# Patient Record
Sex: FEMALE | Race: WHITE | Employment: STUDENT | URBAN - METROPOLITAN AREA
[De-identification: names, ages, dates, MRNs, and addresses within clinical notes are randomized per-mention and may not be internally consistent; named-entity substitution may affect disease eponyms.]

---
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
## 2021-05-25 ENCOUNTER — OFFICE VISIT (OUTPATIENT)
Dept: INTERNAL MEDICINE CLINIC | Age: 21
End: 2021-05-25

## 2021-05-25 VITALS
OXYGEN SATURATION: 100 % | RESPIRATION RATE: 17 BRPM | SYSTOLIC BLOOD PRESSURE: 95 MMHG | DIASTOLIC BLOOD PRESSURE: 60 MMHG | WEIGHT: 112 LBS | TEMPERATURE: 96.8 F | HEART RATE: 70 BPM | HEIGHT: 64 IN | BODY MASS INDEX: 19.12 KG/M2

## 2021-05-25 DIAGNOSIS — Z00.00 ENCOUNTER FOR MEDICAL EXAMINATION TO ESTABLISH CARE: Primary | ICD-10-CM

## 2021-05-25 DIAGNOSIS — K13.79 MOUTH SORES: ICD-10-CM

## 2021-05-25 PROBLEM — N94.6 MENSTRUAL CRAMPS: Status: ACTIVE | Noted: 2021-05-25

## 2021-05-25 PROCEDURE — 99213 OFFICE O/P EST LOW 20 MIN: CPT | Performed by: INTERNAL MEDICINE

## 2021-05-25 RX ORDER — VALACYCLOVIR HYDROCHLORIDE 500 MG/1
500 TABLET, FILM COATED ORAL 2 TIMES DAILY
Qty: 20 TABLET | Refills: 0 | Status: SHIPPED | OUTPATIENT
Start: 2021-05-25 | End: 2022-11-03

## 2021-05-25 RX ORDER — NORGESTIMATE AND ETHINYL ESTRADIOL 0.25-0.035
1 KIT ORAL DAILY
COMMUNITY
End: 2022-10-03 | Stop reason: ALTCHOICE

## 2021-05-25 NOTE — PROGRESS NOTES
Ms. Cruz Wang is a new patient who is here to establish care. CC:  New Patient       HPI:  22 yo woman presenting to establish care   At [place] [id number] design     Patient notes since starting sprintec for menstrual cramps getting regular mouth sores that last weeks and are painful. Mother had breast cancer - at age 37 /   Plans to start mammograms early per gynecologist     3 siblings younger   25 , 13 and 9 yo     Takes sprintec   somestimes takes lysine supplement   Last menstrual cycle completed today  Has not done pap smear planning to do it in June   Gynecologist in the area cannot remember name     Review of systems:  Constitutional: negative for fever, chills, weight loss, night sweats   Eyes : negative for vision changes, eye pain and discharge  Nose and Throat: negative for tinnitus, sore throat   Cardiovascular: negative for chest pain, palpitations and shortness of breath  Respiratory: negative for shortness of breath, cough and wheezing   Gastroinstestinal: negative for abdominal pain, nausea, vomiting, diarrhea, constipation, and blood in the stool  Musculoskeletal: negative for back ache and joint ache   Genitourinary: negative for dysuria, nocturia, polyuria and hematuria   Neurologic: Negative for focal weakness, numbness or incoordination  Skin: negative for rash, pruritus  Hematologic: negative for easy bruising      History reviewed. No pertinent past medical history. Past Surgical History:   Procedure Laterality Date    HX HEENT      HX WISDOM TEETH EXTRACTION  2020       Allergies   Allergen Reactions    Cucumber Angioedema       Current Outpatient Medications on File Prior to Visit   Medication Sig Dispense Refill    norgestimate-ethinyl estradioL (Sprintec, 28,) 0.25-35 mg-mcg tab Take 1 Tablet by mouth daily. No current facility-administered medications on file prior to visit.        family history includes Cancer (age of onset: 37) in her mother; No Known Problems in her father. Social History     Socioeconomic History    Marital status: SINGLE     Spouse name: Not on file    Number of children: Not on file    Years of education: Not on file    Highest education level: Not on file   Occupational History    Not on file   Tobacco Use    Smoking status: Never Smoker    Smokeless tobacco: Never Used   Vaping Use    Vaping Use: Never used   Substance and Sexual Activity    Alcohol use: No    Drug use: No    Sexual activity: Not Currently     Birth control/protection: Pill   Other Topics Concern    Not on file   Social History Narrative    Not on file     Social Determinants of Health     Financial Resource Strain:     Difficulty of Paying Living Expenses:    Food Insecurity:     Worried About Running Out of Food in the Last Year:     [address] in the Last Year:    Transportation Needs:     Lack of Transportation (Medical):  Lack of Transportation (Non-Medical):    Physical Activity:     Days of Exercise per Week:     Minutes of Exercise per Session:    Stress:     Feeling of Stress :    Social Connections:     Frequency of Communication with Friends and Family:     Frequency of Social Gatherings with Friends and Family:     Attends Confucianism Services:     Active Member of Clubs or Organizations:     Attends Club or Organization Meetings:     Marital Status:    Intimate Partner Violence:     Fear of Current or Ex-Partner:     Emotionally Abused:     Physically Abused:     Sexually Abused:        Visit Vitals  BP 95/60 (BP 1 Location: Right arm, BP Patient Position: Sitting, BP Cuff Size: Adult)   Pulse 70   Temp 96.8 °F (36 °C) (Temporal)   Resp 17   Ht 5' 4\" (1.626 m)   Wt 112 lb (50.8 kg)   LMP 05/24/2021   SpO2 100%   BMI 19.22 kg/m²     General:  Well appearing female no acute distress  HEENT:   PERRL,normal conjunctiva. External ear and canals normal, TMs normal.  Hearing normal to voice.   Nose without edema or discharge, normal septum. Ulceration with white center on left cheek   Oropharynx: no erythema, no exudates, no lesions, normal tongue. Neck:  Supple. Thyroid normal size, nontender, without nodules. No carotid bruit. No masses or lymphadenopathy  Respiratory: no respiratory distress,  no wheezing, no rhonchi, no rales. No chest wall tenderness. Cardiovascular:  RRR, normal S1S2, no murmur. Gastrointestinal: normal bowel sounds, soft, nontender, without masses. No hepatosplenomegaly. Extremities +2 pulses, no edema, normal sensation   Musculoskeletal:  Normal gait. Normal digits and nails. Normal strength and tone, no atrophy, and no abnormal movement. Skin:  No rash, no lesions, no ulcers. Skin warm, normal turgor, without induration or nodules. Neuro:  A and OX4, fluent speech, cranial nerves normal 2-12. Sensation normal to light touch. DTR symmetrical  Psych:  Normal affect                   Assessment and Plan:     1. Encounter for medical examination to establish care  Normal exam  Healthy weight    2. Mouth sores  Noted since starting sprintec - unlikely related , lasting 10 days, trial of valtrex to take take at onset of sores  - valACYclovir (VALTREX) 500 mg tablet; Take 1 Tablet by mouth two (2) times a day. Take for 3 days at onset on mouth sore  Dispense: 20 Tablet; Refill: 0  Patient to message me if improves     Follow-up and Dispositions    · Return in about 1 year (around 5/25/2022).           Maxwell Escobar MD

## 2021-07-29 ENCOUNTER — TELEPHONE (OUTPATIENT)
Dept: INTERNAL MEDICINE CLINIC | Age: 21
End: 2021-07-29
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
## 2021-08-02 ENCOUNTER — VIRTUAL VISIT (OUTPATIENT)
Dept: INTERNAL MEDICINE CLINIC | Age: 21
End: 2021-08-02

## 2021-08-02 DIAGNOSIS — F07.81 POST CONCUSSION SYNDROME: ICD-10-CM

## 2021-08-02 DIAGNOSIS — G44.319 ACUTE POST-TRAUMATIC HEADACHE, NOT INTRACTABLE: ICD-10-CM

## 2021-08-02 DIAGNOSIS — S06.0X0A CONCUSSION WITHOUT LOSS OF CONSCIOUSNESS, INITIAL ENCOUNTER: Primary | ICD-10-CM

## 2021-08-02 PROCEDURE — 99213 OFFICE O/P EST LOW 20 MIN: CPT | Performed by: INTERNAL MEDICINE

## 2021-08-02 RX ORDER — AMITRIPTYLINE HYDROCHLORIDE 10 MG/1
10 TABLET, FILM COATED ORAL
Qty: 30 TABLET | Refills: 1 | Status: SHIPPED | OUTPATIENT
Start: 2021-08-02 | End: 2022-01-17 | Stop reason: ALTCHOICE
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
## 2021-08-03 NOTE — TELEPHONE ENCOUNTER
#[phone] momJuliana Fairly is asking if the referral for [facility] for concussion therapy has been sent to them yet? Mom would like a call back pertaining to this.

## 2021-08-05 ENCOUNTER — TELEPHONE (OUTPATIENT)
Dept: INTERNAL MEDICINE CLINIC | Age: 21
End: 2021-08-05

## 2021-08-05 NOTE — TELEPHONE ENCOUNTER
----- Message from Zaynab Patton sent at 8/5/2021 11:04 AM EDT -----  Regarding: /Telephone  Patient return call    Caller's first and last name and relationship (if not the patient): Maria R Rebollar      Best contact number(s):[phone]      Whose call is being returned:Dr. Brandon Jules      Details to clarify the request:Mom received VM late       Message from [facility]

## 2021-08-06 ENCOUNTER — APPOINTMENT (OUTPATIENT)
Dept: INTERNAL MEDICINE CLINIC | Age: 21
End: 2021-08-06

## 2021-08-06 LAB
ALBUMIN SERPL-MCNC: 4 G/DL (ref 3.5–5)
ALBUMIN/GLOB SERPL: 1.5 {RATIO} (ref 1.1–2.2)
ALP SERPL-CCNC: 40 U/L (ref 45–117)
ALT SERPL-CCNC: 16 U/L (ref 12–78)
ANION GAP SERPL CALC-SCNC: 7 MMOL/L (ref 5–15)
AST SERPL-CCNC: 9 U/L (ref 15–37)
BASOPHILS # BLD: 0 K/UL (ref 0–0.1)
BASOPHILS NFR BLD: 0 % (ref 0–1)
BILIRUB SERPL-MCNC: 0.4 MG/DL (ref 0.2–1)
BUN SERPL-MCNC: 12 MG/DL (ref 6–20)
BUN/CREAT SERPL: 22 (ref 12–20)
CALCIUM SERPL-MCNC: 9 MG/DL (ref 8.5–10.1)
CHLORIDE SERPL-SCNC: 109 MMOL/L (ref 97–108)
CO2 SERPL-SCNC: 24 MMOL/L (ref 21–32)
CREAT SERPL-MCNC: 0.54 MG/DL (ref 0.55–1.02)
DIFFERENTIAL METHOD BLD: ABNORMAL
EOSINOPHIL # BLD: 0.1 K/UL (ref 0–0.4)
EOSINOPHIL NFR BLD: 1 % (ref 0–7)
ERYTHROCYTE [DISTWIDTH] IN BLOOD BY AUTOMATED COUNT: 12.3 % (ref 11.5–14.5)
GLOBULIN SER CALC-MCNC: 2.6 G/DL (ref 2–4)
GLUCOSE SERPL-MCNC: 96 MG/DL (ref 65–100)
HCT VFR BLD AUTO: 34.5 % (ref 35–47)
HGB BLD-MCNC: 11.2 G/DL (ref 11.5–16)
IMM GRANULOCYTES # BLD AUTO: 0 K/UL (ref 0–0.04)
IMM GRANULOCYTES NFR BLD AUTO: 0 % (ref 0–0.5)
LYMPHOCYTES # BLD: 1.7 K/UL (ref 0.8–3.5)
LYMPHOCYTES NFR BLD: 24 % (ref 12–49)
MCH RBC QN AUTO: 30.3 PG (ref 26–34)
MCHC RBC AUTO-ENTMCNC: 32.5 G/DL (ref 30–36.5)
MCV RBC AUTO: 93.2 FL (ref 80–99)
MONOCYTES # BLD: 0.4 K/UL (ref 0–1)
MONOCYTES NFR BLD: 6 % (ref 5–13)
NEUTS SEG # BLD: 4.8 K/UL (ref 1.8–8)
NEUTS SEG NFR BLD: 69 % (ref 32–75)
NRBC # BLD: 0 K/UL (ref 0–0.01)
NRBC BLD-RTO: 0 PER 100 WBC
PLATELET # BLD AUTO: 229 K/UL (ref 150–400)
PMV BLD AUTO: 9.9 FL (ref 8.9–12.9)
POTASSIUM SERPL-SCNC: 3.7 MMOL/L (ref 3.5–5.1)
PROT SERPL-MCNC: 6.6 G/DL (ref 6.4–8.2)
RBC # BLD AUTO: 3.7 M/UL (ref 3.8–5.2)
SODIUM SERPL-SCNC: 140 MMOL/L (ref 136–145)
T4 FREE SERPL-MCNC: 1.2 NG/DL (ref 0.8–1.5)
TSH SERPL DL<=0.05 MIU/L-ACNC: 0.76 UIU/ML (ref 0.36–3.74)
WBC # BLD AUTO: 7 K/UL (ref 3.6–11)

## 2021-08-09 NOTE — PROGRESS NOTES
Mild anemia close to normal.  This is common due to menstrual periods.   Increase iron intake in foods : meats, green vegetables, spinach, lima beans, red beans, dried apricots

## 2022-01-17 ENCOUNTER — VIRTUAL VISIT (OUTPATIENT)
Dept: INTERNAL MEDICINE CLINIC | Age: 22
End: 2022-01-17

## 2022-01-17 DIAGNOSIS — J40 BRONCHITIS: Primary | ICD-10-CM

## 2022-01-17 PROCEDURE — 99213 OFFICE O/P EST LOW 20 MIN: CPT | Performed by: INTERNAL MEDICINE

## 2022-01-17 RX ORDER — PREDNISONE 20 MG/1
40 TABLET ORAL
Qty: 8 TABLET | Refills: 0 | Status: SHIPPED
Start: 2022-01-17 | End: 2022-03-24

## 2022-01-17 RX ORDER — AZITHROMYCIN 250 MG/1
250 TABLET, FILM COATED ORAL SEE ADMIN INSTRUCTIONS
Qty: 6 TABLET | Refills: 0 | Status: SHIPPED | OUTPATIENT
Start: 2022-01-17 | End: 2022-01-17 | Stop reason: ALTCHOICE

## 2022-01-17 RX ORDER — AMOXICILLIN AND CLAVULANATE POTASSIUM 875; 125 MG/1; MG/1
1 TABLET, FILM COATED ORAL 2 TIMES DAILY
Qty: 14 TABLET | Refills: 0 | Status: SHIPPED
Start: 2022-01-17 | End: 2022-03-24

## 2022-01-17 NOTE — PROGRESS NOTES
Nestor Sims (: 2000) is a 24 y.o. female, established patient, here for evaluation of the following chief complaint(s):   Follow-up (covid)       ASSESSMENT/PLAN:  Below is the assessment and plan developed based on review of pertinent history, labs, studies, and medications. No follow-ups on file. Nestor Sims, was evaluated through a synchronous (real-time) audio-video encounter. The patient (or guardian if applicable) is aware that this is a billable service. Verbal consent to proceed has been obtained within the past 12 months. The visit was conducted pursuant to the emergency declaration under the [facility], [address] [place] [address] authority and the [employer] and [employer] Act. Patient identification was verified, and a caregiver was present when appropriate. The patient was located in a state where the provider was credentialed to provide care. An electronic signature was used to authenticate this note.   -- Krystal Weaver LPN

## 2022-01-17 NOTE — LETTER
NOTIFICATION RETURN TO WORK / SCHOOL    1/17/2022 3:08 PM    Ms. Brynn Montalvo  [address] [address]      To Whom It May Concern:    Brynn Montalvo is currently under the care of [facility]. She will return to work/school on: 01/19/2022    If there are questions or concerns please have the patient contact our office.         Sincerely,      Dom Thompson MD

## 2022-01-17 NOTE — PROGRESS NOTES
CC: Follow-up (covid)      HPI:    She is a 24 y.o. female who presents for evaluation of       Concussion resolved        Got  January 1st   Then tested positive January 3rd   less symptomatic    Patient felt up and down but now doing worst having chest congestion  and sinus pressure  No fever  Feels fatigued, difficulty getting out of bed   She was vaccinated    Has to return to school tomorrow at [address]    Also notes painful urination and wonders if has UTI \" seems to be  Clearing up\"       This is an established visit conducted via telemedicine with video. The patient has been instructed that this meets HIPAA criteria and acknowledges and agrees to this method of visitation. Pursuant to the emergency declaration under the [facility], [facility] waiver authority and the [employer] and [employer] Act, this Virtual Visit was conducted, with patient's consent, to reduce the patient's risk of exposure to COVID-19 and provide continuity of care for an established patient. Services were provided through a video synchronous discussion virtually to substitute for in-person clinic visit. ROS:      History reviewed. No pertinent past medical history. Current Outpatient Medications on File Prior to Visit   Medication Sig Dispense Refill    norgestimate-ethinyl estradioL (Sprintec, 28,) 0.25-35 mg-mcg tab Take 1 Tablet by mouth daily.  valACYclovir (VALTREX) 500 mg tablet Take 1 Tablet by mouth two (2) times a day. Take for 3 days at onset on mouth sore 20 Tablet 0     No current facility-administered medications on file prior to visit.        Past Surgical History:   Procedure Laterality Date    HX HEENT      HX WISDOM TEETH EXTRACTION  2020       Family History   Problem Relation Age of Onset    Cancer Mother 37        breast cancer     No Known Problems Father      Reviewed and no changes Social History     Socioeconomic History    Marital status:      Spouse name: Not on file    Number of children: Not on file    Years of education: Not on file    Highest education level: Not on file   Occupational History    Not on file   Tobacco Use    Smoking status: Never Smoker    Smokeless tobacco: Never Used   Vaping Use    Vaping Use: Never used   Substance and Sexual Activity    Alcohol use: No    Drug use: No    Sexual activity: Not Currently     Birth control/protection: Pill   Other Topics Concern    Not on file   Social History Narrative    Not on file     Social Determinants of Health     Financial Resource Strain:     Difficulty of Paying Living Expenses: Not on file   Food Insecurity:     Worried About Running Out of Food in the Last Year: Not on file    Kimberlee of Food in the Last Year: Not on file   Transportation Needs:     Lack of Transportation (Medical): Not on file    Lack of Transportation (Non-Medical): Not on file   Physical Activity:     Days of Exercise per Week: Not on file    Minutes of Exercise per Session: Not on file   Stress:     Feeling of Stress : Not on file   Social Connections:     Frequency of Communication with Friends and Family: Not on file    Frequency of Social Gatherings with Friends and Family: Not on file    Attends Confucianism Services: Not on file    Active Member of [address] [place] [address] [employer] or Organizations: Not on file    Attends Club or Organization Meetings: Not on file    Marital Status: Not on file   Intimate Partner Violence:     Fear of Current or Ex-Partner: Not on file    Emotionally Abused: Not on file    Physically Abused: Not on file    Sexually Abused: Not on file   Housing Stability:     Unable to Pay for Housing in the Last Year: Not on file    Number of [place] in the Last Year: Not on file    Unstable Housing in the Last Year: Not on file          There were no vitals taken for this visit.     Physical Examination:   Gen: well appearing female  HEENT: normal conjunctiva, no audible congestion, patient does not see oral erythema, has MMM  Neck: patient does not feel enlarged or tender LAD or masses  Resp: normal respiratory effort, no audible wheezing. CV: patient does not feel palpitations or heart irregularity  Abd: patient does not feel abdominal tenderness or mass, patient does not notice distension  Extrem: patient does not see swelling in ankles or joints. Neuro: Alert and oriented, able to answer questions without difficulty, able to move all extremities and walk normally          Lab Results   Component Value Date/Time    WBC 7.0 08/06/2021 11:00 AM    HGB 11.2 (L) 08/06/2021 11:00 AM    HCT 34.5 (L) 08/06/2021 11:00 AM    PLATELET 142 38/09/7553 11:00 AM    MCV 93.2 08/06/2021 11:00 AM     Lab Results   Component Value Date/Time    Sodium 140 08/06/2021 11:00 AM    Potassium 3.7 08/06/2021 11:00 AM    Chloride 109 (H) 08/06/2021 11:00 AM    CO2 24 08/06/2021 11:00 AM    Anion gap 7 08/06/2021 11:00 AM    Glucose 96 08/06/2021 11:00 AM    BUN 12 08/06/2021 11:00 AM    Creatinine 0.54 (L) 08/06/2021 11:00 AM    BUN/Creatinine ratio 22 (H) 08/06/2021 11:00 AM    GFR est AA >60 08/06/2021 11:00 AM    GFR est non-AA >60 08/06/2021 11:00 AM    Calcium 9.0 08/06/2021 11:00 AM     No results found for: CHOL, CHOLPOCT, CHOLX, CHLST, CHOLV, HDL, HDLPOC, HDLP, LDL, LDLCPOC, LDLC, DLDLP, VLDLC, VLDL, TGLX, TRIGL, TRIGP, TGLPOCT, CHHD, CHHDX  Lab Results   Component Value Date/Time    TSH 0.76 08/06/2021 11:00 AM     No results found for: PSA, PSA2, PSAR1, PSA1, PSAR2, PSA3, PSAR3, [id number], [id number]  No results found for: HBA1C, YBZ0IYRK, TPY2EFCT, IST7TQSR  No results found for: VITD3, XQVID2, XQVID3, [place], VD3RIA    Lab Results   Component Value Date/Time    ALT (SGPT) 16 08/06/2021 11:00 AM    Alk. phosphatase 40 (L) 08/06/2021 11:00 AM    Bilirubin, total 0.4 08/06/2021 11:00 AM           Assessment/Plan:    1.  Bronchitis  Had covid 19 diagnosed January 3rd and now with bronchitis symptoms   - predniSONE (DELTASONE) 20 mg tablet; Take 40 mg by mouth daily (with breakfast). Dispense: 8 Tablet; Refill: 0  - amoxicillin-clavulanate (AUGMENTIN) 875-125 mg per tablet; Take 1 Tablet by mouth two (2) times a day. Dispense: 14 Tablet; Refill: 0  Note for school done Yasmin Hankins MD    This is an established visit conducted via real time video and audio telemedicine. The patient has been instructed that this meets HIPAA criteria and acknowledges and agrees to this method of visitation.

## 2022-01-27 ENCOUNTER — PATIENT MESSAGE (OUTPATIENT)
Dept: INTERNAL MEDICINE CLINIC | Age: 22
End: 2022-01-27

## 2022-01-27 RX ORDER — FLUCONAZOLE 150 MG/1
150 TABLET ORAL DAILY
Qty: 2 TABLET | Refills: 0 | Status: SHIPPED | OUTPATIENT
Start: 2022-01-27 | End: 2022-01-28

## 2022-01-27 NOTE — TELEPHONE ENCOUNTER
Eliezer Szymanski 1/27/2022 8:34 AM EST      ----- Message -----  From: Bharati Borges  Sent: 1/27/2022 7:08 AM EST  To: [facility] Nurse Pool  Subject: Yeast infection     After being on the antibiotics and steroids, I am all better! But antibiotics often cause me to get a yeast infection, I think I have one now. Is it possible for you to call in Diflucan for me?  Thank you

## 2022-03-19 PROBLEM — N94.6 MENSTRUAL CRAMPS: Status: ACTIVE | Noted: 2021-05-25

## 2022-03-24 ENCOUNTER — VIRTUAL VISIT (OUTPATIENT)
Dept: INTERNAL MEDICINE CLINIC | Age: 22
End: 2022-03-24

## 2022-03-24 DIAGNOSIS — J06.9 ACUTE URI: Primary | ICD-10-CM

## 2022-03-24 PROCEDURE — 99213 OFFICE O/P EST LOW 20 MIN: CPT | Performed by: INTERNAL MEDICINE

## 2022-03-24 RX ORDER — ESCITALOPRAM OXALATE 10 MG/1
TABLET ORAL
COMMUNITY
Start: 2022-02-15

## 2022-03-24 RX ORDER — PREDNISONE 10 MG/1
TABLET ORAL
Qty: 21 TABLET | Refills: 0 | Status: SHIPPED | OUTPATIENT
Start: 2022-03-24 | End: 2022-03-29 | Stop reason: ALTCHOICE

## 2022-03-24 RX ORDER — FLUCONAZOLE 150 MG/1
TABLET ORAL
COMMUNITY
Start: 2022-02-15 | End: 2022-10-03 | Stop reason: ALTCHOICE

## 2022-03-24 NOTE — PROGRESS NOTES
Sridhar Cantor is a 24 y.o. female  Chief Complaint   Patient presents with    Cold Symptoms    Sinus Infection     about 2 weeks      Health Maintenance Due   Topic Date Due    DTaP/Tdap/Td series (2 - Tdap) 05/31/2012    Pap Smear  Never done    Flu Vaccine (1) Never done    COVID-19 Vaccine (3 - Booster for Pfizer series) 09/01/2021     There were no vitals taken for this visit. 1. Have you been to the ER, urgent care clinic since your last visit? Hospitalized since your last visit? No     2. Have you seen or consulted any other health care providers outside of the [address] [place] [address] since your last visit? Include any pap smears or colon screening. Yes, [facility] for [facility]rep test and was neg. Patient-Reported Vitals 3/24/2022   Patient-Reported Weight 112lb   Patient-Reported Height -   Patient-Reported Temperature -   Patient-Reported LMP -       Patient Phone Number/Email:  [email or web address]

## 2022-03-24 NOTE — PROGRESS NOTES
Karen Conner is a 24 y.o. female who was seen by synchronous (real-time) audio-video technology on 3/24/2022 for Cold Symptoms and Sinus Infection (about 2 weeks )        Progress Note         PROGRESS NOTE  Name: Karen Conner   : 2000       ASSESSMENT/ PLAN:     Acute URI: Unclear etiology, could have an allergic component. Treat symptomatically. Since she has had this for 2 weeks, go ahead with antibiotics (today is day 2). We'll add a steroid taper. Follow-up and Dispositions  ·   Return if symptoms worsen or fail to improve. I have reviewed the patient's medications and risks/side effects/benefits were discussed. Diagnosis(-es) explained to patient and questions answered. Literature provided where appropriate. SUBJECTIVE  Ms. Karen Conner is a patient of Francois Gonzalez MD and presents today acutely for     Chief Complaint   Patient presents with    Cold Symptoms    Sinus Infection     about 2 weeks        She has been sick for two weeks. \"Headache, sore throat, and fatigue. \" No fevers. It improved over the weekend, and it is coming back. She and her  were tested for strep, and negative. She was negative for COVID. She was evaluated by a [facility] doctor, and was put on penicillin. \"I'm worse now. I have a dry cough, difficulty breathing, feels like bronchitis I've had before. \"     Her illness occurred after her trip to [place], and \"I used to feel like this when I lived in [place]. I was sick a lot there and had trouble breathing and got bad chest infections. \"     She takes zyrtec daily. OBJECTIVE  There were no vitals taken for this visit. Gen: Pleasant 24 y.o.  female in NAD.              Objective:     Patient-Reported Vitals 3/24/2022   Patient-Reported Weight 112lb   Patient-Reported Height -   Patient-Reported Temperature -   Patient-Reported LMP -        [INSTRUCTIONS:  \"[x]\" Indicates a positive item  \"[]\" Indicates a negative item -- DELETE ALL ITEMS NOT EXAMINED]    Constitutional: [x] Appears well-developed and well-nourished [x] No apparent distress      [] Abnormal -     Mental status: [x] Alert and awake  [x] Oriented to person/place/time [x] Able to follow commands    [] Abnormal -     Eyes:   EOM    [x]  Normal    [] Abnormal -   Sclera  [x]  Normal    [] Abnormal -          Discharge [x]  None visible   [] Abnormal -     HENT: [x] Normocephalic, atraumatic  [] Abnormal -   [x] Mouth/Throat: Mucous membranes are moist    External Ears [x] Normal  [] Abnormal -    Neck: [x] No visualized mass [] Abnormal -     Pulmonary/Chest: [x] Respiratory effort normal   [x] No visualized signs of difficulty breathing or respiratory distress        [] Abnormal -      Musculoskeletal:   [x] Normal gait with no signs of ataxia         [x] Normal range of motion of neck        [] Abnormal -     Neurological:        [x] No Facial Asymmetry (Cranial nerve 7 motor function) (limited exam due to video visit)          [x] No gaze palsy        [] Abnormal -          Skin:        [x] No significant exanthematous lesions or discoloration noted on facial skin         [] Abnormal -            Psychiatric:       [x] Normal Affect [] Abnormal -       Other pertinent observable physical exam findings:-        We discussed the expected course, resolution and complications of the diagnosis(es) in detail. Medication risks, benefits, costs, interactions, and alternatives were discussed as indicated. I advised her to contact the office if her condition worsens, changes or fails to improve as anticipated. She expressed understanding with the diagnosis(es) and plan. Nestor Sims, who was evaluated through a patient-initiated, synchronous (real-time) audio-video encounter, and/or her healthcare decision maker, is aware that it is a billable service, with coverage as determined by her insurance carrier.  She provided verbal consent to proceed: YES, and patient identification was verified. It was conducted pursuant to the emergency declaration under the [facility], [address] [place] [address] and the [employer] [facility] General Act. A caregiver was present when appropriate. Ability to conduct physical exam was limited. I was in office. The patient was at home or otherwise outside the office.       Lisa Mccarty MD

## 2022-03-28 ENCOUNTER — PATIENT MESSAGE (OUTPATIENT)
Dept: INTERNAL MEDICINE CLINIC | Age: 22
End: 2022-03-28

## 2022-03-28 NOTE — TELEPHONE ENCOUNTER
----- Message from Jose Young sent at 3/28/2022  8:13 AM EDT -----  Subject: Appointment Request    Reason for Call: Urgent Cough Cold    QUESTIONS  Type of Appointment? Established Patient  Reason for appointment request? No appointments available during search  Additional Information for Provider? pt called to make an appt for   possible bronchitis I offered to do a virtual visit with another provider   and she refused  ---------------------------------------------------------------------------  --------------  CALL BACK INFO  What is the best way for the office to contact you? OK to leave message on   voicemail  Preferred Call Back Phone Number? [phone]  ---------------------------------------------------------------------------  --------------  SCRIPT ANSWERS  Relationship to Patient? Self  Are you currently unable to finish sentences due to any difficulty   breathing? No  Are you unable to swallow liquids? No  Are you having fevers (100.4 or greater), chills, or sweats? No  Do you have COPD, asthma or a chronic lung condition? No  Have your symptoms been present for more than 5 days? Yes   Have you been diagnosed with, awaiting test results for, or told that you   are suspected of having COVID-19 (Coronavirus)? (If patient has tested   negative or was tested as a requirement for work, school, or travel and   not based on symptoms, answer no)? No  Within the past 10 days have you developed any of the following symptoms   (answer no if symptoms have been present longer than 10 days or began   more than 10 days ago)? Fever or Chills, Cough, Shortness of breath or   difficulty breathing, Loss of taste or smell, Sore throat, Nasal   congestion, Sneezing or runny nose, Fatigue or generalized body aches   (answer no if pain is specific to a body part e.g. back pain), Diarrhea,   Headache?  Yes

## 2022-03-28 NOTE — TELEPHONE ENCOUNTER
3/28 booked a virtual appointment for tomorrow. Tried to call but  isn't set up.   Sent my chart message

## 2022-03-29 ENCOUNTER — HOSPITAL ENCOUNTER (OUTPATIENT)
Dept: GENERAL RADIOLOGY | Age: 22
Discharge: HOME | End: 2022-03-29

## 2022-03-29 ENCOUNTER — VIRTUAL VISIT (OUTPATIENT)
Dept: INTERNAL MEDICINE CLINIC | Age: 22
End: 2022-03-29

## 2022-03-29 DIAGNOSIS — J40 BRONCHITIS: Primary | ICD-10-CM

## 2022-03-29 DIAGNOSIS — J45.31 MILD PERSISTENT REACTIVE AIRWAY DISEASE WITH ACUTE EXACERBATION: ICD-10-CM

## 2022-03-29 DIAGNOSIS — J40 BRONCHITIS: ICD-10-CM

## 2022-03-29 PROCEDURE — 71046 X-RAY EXAM CHEST 2 VIEWS: CPT

## 2022-03-29 PROCEDURE — 99213 OFFICE O/P EST LOW 20 MIN: CPT | Performed by: INTERNAL MEDICINE

## 2022-03-29 RX ORDER — ALBUTEROL SULFATE 90 UG/1
1 AEROSOL, METERED RESPIRATORY (INHALATION)
Qty: 18 G | Refills: 1 | Status: SHIPPED | OUTPATIENT
Start: 2022-03-29

## 2022-03-29 RX ORDER — CEFUROXIME AXETIL 500 MG/1
500 TABLET ORAL 2 TIMES DAILY
Qty: 14 TABLET | Refills: 0 | Status: SHIPPED | OUTPATIENT
Start: 2022-03-29 | End: 2022-03-31 | Stop reason: ALTCHOICE

## 2022-03-29 NOTE — PROGRESS NOTES
Good news is chest x ray shows no lung infection , no pneumonia. Symptoms are due to bronchitis plus likely a sinus infection.  Take the antibiotic prescribed and let me know how you are doing tomorrow

## 2022-03-29 NOTE — PROGRESS NOTES
1. \"Have you been to the ER, urgent care clinic since your last visit? Hospitalized since your last visit? \" Yes Reason for visit: [address]    2. \"Have you seen or consulted any other health care providers outside of the [address] [place] [address] since your last visit? \" No     3. For patients aged 39-70: Has the patient had a colonoscopy / FIT/ Cologuard? NA - based on age      If the patient is female:    4. For patients aged 41-77: Has the patient had a mammogram within the past 2 years? NA - based on age or sex      11. For patients aged 21-65: Has the patient had a pap smear? Yes - Care Gap present.  Rooming MA/LPN to request most recent results  [facility]

## 2022-03-29 NOTE — PROGRESS NOTES
CC: Nasal Congestion, GERD, Back Pain, and Breathing Problem      HPI:    She is a 24 y.o. female who presents for evaluation of of URI started out 3 weeks ago  Started with sore throat and HA /Tested negative for strep at [facility] and prescribed  Penicillin  Started with sore throat and HA   Then had congestion and then symptoms moved to chest, last night had severe back pain and shortness of breath. Breathing is better today   Saw  Dr Kaelyn Mark 03/24 and prescribed steroids - no improvement in symptoms  She had Covid 23 in January and has had recurrent URIs since then   She had frequent bronchitis when she lived in [place]    She has had recurrent yeast infections since getting  and taking weekly fluconazole    She is on lexapro for depression which is stable    She is at [address] [place] [address]      This is an established visit conducted via telemedicine with video. The patient has been instructed that this meets HIPAA criteria and acknowledges and agrees to this method of visitation. Pursuant to the emergency declaration under the [facility] waiver authority and the [employer] and [employer] Act, this Virtual Visit was conducted, with patient's consent, to reduce the patient's risk of exposure to COVID-19 and provide continuity of care for an established patient. Services were provided through a video synchronous discussion virtually to substitute for in-person clinic visit. ROS:  10 systems reviewed and negative other then HPI  History reviewed. No pertinent past medical history. Current Outpatient Medications on File Prior to Visit   Medication Sig Dispense Refill    escitalopram oxalate (Lexapro) 10 mg tablet       fluconazole (Diflucan) 150 mg tablet       norgestimate-ethinyl estradioL (Sprintec, 28,) 0.25-35 mg-mcg tab Take 1 Tablet by mouth daily.       valACYclovir (VALTREX) 500 mg tablet Take 1 Tablet by mouth two (2) times a day. Take for 3 days at onset on mouth sore 20 Tablet 0     No current facility-administered medications on file prior to visit. Past Surgical History:   Procedure Laterality Date    HX HEENT      HX WISDOM TEETH EXTRACTION  2020       Family History   Problem Relation Age of Onset    Cancer Mother 37        breast cancer     No Known Problems Father      Reviewed and no changes     Social History     Socioeconomic History    Marital status:      Spouse name: Not on file    Number of children: Not on file    Years of education: Not on file    Highest education level: Not on file   Occupational History    Not on file   Tobacco Use    Smoking status: Never Smoker    Smokeless tobacco: Never Used   Vaping Use    Vaping Use: Never used   Substance and Sexual Activity    Alcohol use: No    Drug use: No    Sexual activity: Not Currently     Birth control/protection: Pill   Other Topics Concern    Not on file   Social History Narrative    Not on file     Social Determinants of Health     Financial Resource Strain:     Difficulty of Paying Living Expenses: Not on file   Food Insecurity:     Worried About Running Out of Food in the Last Year: Not on file    Kimberlee of Food in the Last Year: Not on file   Transportation Needs:     Lack of Transportation (Medical): Not on file    Lack of Transportation (Non-Medical):  Not on file   Physical Activity:     Days of Exercise per Week: Not on file    Minutes of Exercise per Session: Not on file   Stress:     Feeling of Stress : Not on file   Social Connections:     Frequency of Communication with Friends and Family: Not on file    Frequency of Social Gatherings with Friends and Family: Not on file    Attends Congregational Services: Not on file    Active Member of Clubs or Organizations: Not on file    Attends Club or Organization Meetings: Not on file    Marital Status: Not on file   Intimate Partner Violence:     Fear of Current or Ex-Partner: Not on file    Emotionally Abused: Not on file    Physically Abused: Not on file    Sexually Abused: Not on file   Housing Stability:     Unable to Pay for Housing in the Last Year: Not on file    Number of Places Lived in the Last Year: Not on file    Unstable Housing in the Last Year: Not on file          There were no vitals taken for this visit. Physical Examination:   Gen: Mildly ill appearing female  HEENT: normal conjunctiva, no audible congestion, patient does not see oral erythema, has MMM  Neck: patient does not feel enlarged or tender LAD or masses  Resp: normal respiratory effort, no audible wheezing. CV: patient does not feel palpitations or heart irregularity  Abd: patient does not feel abdominal tenderness or mass, patient does not notice distension  Extrem: patient does not see swelling in ankles or joints.    Neuro: Alert and oriented, able to answer questions without difficulty, able to move all extremities and walk normally          Lab Results   Component Value Date/Time    WBC 7.0 08/06/2021 11:00 AM    HGB 11.2 (L) 08/06/2021 11:00 AM    HCT 34.5 (L) 08/06/2021 11:00 AM    PLATELET 630 77/68/0063 11:00 AM    MCV 93.2 08/06/2021 11:00 AM     Lab Results   Component Value Date/Time    Sodium 140 08/06/2021 11:00 AM    Potassium 3.7 08/06/2021 11:00 AM    Chloride 109 (H) 08/06/2021 11:00 AM    CO2 24 08/06/2021 11:00 AM    Anion gap 7 08/06/2021 11:00 AM    Glucose 96 08/06/2021 11:00 AM    BUN 12 08/06/2021 11:00 AM    Creatinine 0.54 (L) 08/06/2021 11:00 AM    BUN/Creatinine ratio 22 (H) 08/06/2021 11:00 AM    GFR est AA >60 08/06/2021 11:00 AM    GFR est non-AA >60 08/06/2021 11:00 AM    Calcium 9.0 08/06/2021 11:00 AM     No results found for: CHOL, CHOLPOCT, CHOLX, CHLST, CHOLV, HDL, HDLPOC, HDLP, LDL, LDLCPOC, LDLC, DLDLP, VLDLC, VLDL, TGLX, TRIGL, TRIGP, TGLPOCT, CHHD, CHHDX  Lab Results   Component Value Date/Time    TSH 0.76 2021 11:00 AM     No results found for: PSA, PSA2, PSAR1, PSA1, PSAR2, PSA3, PSAR3, [id number], [id number]  No results found for: HBA1C, PSE4ZNCF, RLP7PJOX, DNT3JDMG  No results found for: VITD3, Sandhya Quails, VD3RIA    Lab Results   Component Value Date/Time    ALT (SGPT) 16 2021 11:00 AM    Alk. phosphatase 40 (L) 2021 11:00 AM    Bilirubin, total 0.4 2021 11:00 AM           Assessment/Plan:    1. Bronchitis  URI symptoms for 3 weeks /now with back pain will obtain chest x ray to rule out PNA  - XR CHEST PA LAT; Future  - ALLERGEN PROFILE WITH COMPONENT REFLEXES, RESPIRATORY AREA 2  - albuterol (PROVENTIL HFA, VENTOLIN HFA, PROAIR HFA) 90 mcg/actuation inhaler; Take 1 Puff by inhalation every six (6) hours as needed for Wheezing. Dispense: 18 g; Refill: 1  - cefUROXime (CEFTIN) 500 mg tablet; Take 1 Tablet by mouth two (2) times a day. Dispense: 14 Tablet; Refill: 0    2  Mild persistent reactive airway disease with acute exacerbation  Having recurrent chest symptoms since covid 19, during childhood had frequent URIs / asthma like symptoms. Will refer to Dr Iveth Price 2  - Valery Mario MD    This is an established visit conducted via real time video and audio telemedicine. The patient has been instructed that this meets HIPAA criteria and acknowledges and agrees to this method of visitation.

## 2022-03-29 NOTE — LETTER
NOTIFICATION RETURN TO WORK / SCHOOL    3/31/2022 11:34 AM    Ms. Nicole Andrews  [address] [address]      To Whom It May Concern:    Nicole Andrews is currently under the care of [facility]. Patient is acutely ill please excuse from class from 03/30- 04/01. If there are questions or concerns please have the patient contact our office.         Sincerely,      Nikolas Mukherjee MD

## 2022-03-30 NOTE — PROGRESS NOTES
Called, spoke to pt. Two pt identifiers confirmed. Pt informed xray results per.   See message below. Good news is chest x ray shows no lung infection , no pneumonia. Symptoms are due to bronchitis plus likely a sinus infection. Take the antibiotic prescribed and let me know how you are doing tomorrow      Patient verbalized understanding of information discussed with no further questions at this time.

## 2022-03-31 ENCOUNTER — CLINICAL SUPPORT (OUTPATIENT)
Dept: INTERNAL MEDICINE CLINIC | Age: 22
End: 2022-03-31

## 2022-03-31 VITALS
RESPIRATION RATE: 16 BRPM | OXYGEN SATURATION: 98 % | WEIGHT: 112 LBS | HEART RATE: 62 BPM | DIASTOLIC BLOOD PRESSURE: 60 MMHG | HEIGHT: 65 IN | TEMPERATURE: 97.1 F | SYSTOLIC BLOOD PRESSURE: 91 MMHG | BODY MASS INDEX: 18.66 KG/M2

## 2022-03-31 DIAGNOSIS — R10.13 EPIGASTRIC PAIN: Primary | ICD-10-CM

## 2022-03-31 DIAGNOSIS — R07.81 PLEURITIC CHEST PAIN: ICD-10-CM

## 2022-03-31 RX ORDER — PANTOPRAZOLE SODIUM 40 MG/1
40 TABLET, DELAYED RELEASE ORAL 2 TIMES DAILY
Qty: 60 TABLET | Refills: 1 | Status: SHIPPED | OUTPATIENT
Start: 2022-03-31 | End: 2022-10-03 | Stop reason: ALTCHOICE

## 2022-03-31 RX ORDER — SUCRALFATE 1 G/10ML
1 SUSPENSION ORAL 4 TIMES DAILY
Qty: 420 ML | Refills: 0 | Status: SHIPPED | OUTPATIENT
Start: 2022-03-31 | End: 2022-10-03 | Stop reason: ALTCHOICE

## 2022-03-31 NOTE — PATIENT INSTRUCTIONS
Stop all antibiotics  Take protonix or nexium over the counter 40mg twice day 30 minutes before eating , must eat 30 minutes after    Take carafate as needed to help with the pain     Doing labs today     Avoid spicy foods, tomatoes, ketchup, orange juice. Avoid acidic foods. Avoid fast food.    Eat home made

## 2022-03-31 NOTE — PROGRESS NOTES
1. \"Have you been to the ER, urgent care clinic since your last visit? Hospitalized since your last visit? \" No    2. \"Have you seen or consulted any other health care providers outside of the [address] [place] [address] since your last visit? \" No     3. For patients aged 39-70: Has the patient had a colonoscopy / FIT/ Cologuard? No      If the patient is female:    4. For patients aged 41-77: Has the patient had a mammogram within the past 2 years? No      5. For patients aged 21-65: Has the patient had a pap smear?  No

## 2022-03-31 NOTE — PROGRESS NOTES
Patient is presenting to follow up on virtual visit, no improvement on antibiotic. Nuno Molina increased pain in medi abdomen radiating to back. No vomiting  Also has pleuritic pain with deep breaths \" hurts all over ribs\"  No fever and no chills     Exam:   Visit Vitals  BP 91/60   Pulse 62   Temp 97.1 °F (36.2 °C) (Temporal)   Resp 16   Ht 5' 5\" (1.651 m)   Wt 112 lb (50.8 kg)   SpO2 98%   BMI 18.64 kg/m²     Gen: A and OX 4, appears to be in pain  HEENT: no scleral icterus, MMM  CV: nl S1S2 no m/g/r  Lungs: CTA B/L   Abdomen: pain pressing on mid upper chest, normal BM, no rebound tenderness no tenderness in all 4 quadrants     A/P   Labs ordered and dimer elevated therefore ordered CTA   Orders Placed This Encounter    CTA CHEST W OR W WO CONT     Standing Status:   Future     Number of Occurrences:   1     Standing Expiration Date:   5/1/2023     Order Specific Question:   Is Patient Pregnant?      Answer:   No     Order Specific Question:   STAT Creatinine as indicated     Answer:   Yes     Order Specific Question:   Reason for Exam     Answer:   rule out blood clot in the lung    METABOLIC PANEL, COMPREHENSIVE     Standing Status:   Future     Number of Occurrences:   1     Standing Expiration Date:   3/31/2023    CBC WITH AUTOMATED DIFF     Standing Status:   Future     Number of Occurrences:   1     Standing Expiration Date:   3/31/2023    D DIMER     Standing Status:   Future     Number of Occurrences:   1     Standing Expiration Date:   3/31/2023    LIPASE     Standing Status:   Future     Number of Occurrences:   1     Standing Expiration Date:   3/31/2023    H PYLORI AG, STOOL     Standing Status:   Future     Standing Expiration Date:   3/31/2023     Order Specific Question:   Specimen source     Answer:   Stool [235]    SAMPLES BEING HELD     ATLASSITEID:[id number]    AMB POC URINE PREGNANCY TEST, VISUAL COLOR COMPARISON    sucralfate (CARAFATE) 100 mg/mL suspension     Sig: Take 5 mL by mouth four (4) times daily. Dispense:  420 mL     Refill:  0    pantoprazole (PROTONIX) 40 mg tablet     Sig: Take 1 Tablet by mouth two (2) times a day.  Take twice a day 30 minutes prior to meals     Dispense:  60 Tablet     Refill:  1

## 2022-03-31 NOTE — LETTER
NOTIFICATION RETURN TO WORK / SCHOOL    3/31/2022 2:53 PM    Ms. Severo Goodness  [address] [address]      To Whom It May Concern:    Severo Goodness is currently under the care of [facility]. She will return to work/school on: 03/30- 04/01. If there are questions or concerns please have the patient contact our office.         Sincerely,      Emerald Baptiste MD

## 2022-04-01 ENCOUNTER — CLINICAL SUPPORT (OUTPATIENT)
Dept: INTERNAL MEDICINE CLINIC | Age: 22
End: 2022-04-01

## 2022-04-01 ENCOUNTER — HOSPITAL ENCOUNTER (OUTPATIENT)
Dept: CT IMAGING | Age: 22
Discharge: HOME | End: 2022-04-01
Attending: INTERNAL MEDICINE

## 2022-04-01 DIAGNOSIS — R07.81 PLEURITIC CHEST PAIN: ICD-10-CM

## 2022-04-01 LAB
ALBUMIN SERPL-MCNC: 3.8 G/DL (ref 3.5–5)
ALBUMIN/GLOB SERPL: 1.2 {RATIO} (ref 1.1–2.2)
ALP SERPL-CCNC: 60 U/L (ref 45–117)
ALT SERPL-CCNC: 21 U/L (ref 12–78)
ANION GAP SERPL CALC-SCNC: 5 MMOL/L (ref 5–15)
AST SERPL-CCNC: 12 U/L (ref 15–37)
BASOPHILS # BLD: 0.1 K/UL (ref 0–0.1)
BASOPHILS NFR BLD: 0 % (ref 0–1)
BILIRUB SERPL-MCNC: 0.3 MG/DL (ref 0.2–1)
BUN SERPL-MCNC: 17 MG/DL (ref 6–20)
BUN/CREAT SERPL: 28 (ref 12–20)
CALCIUM SERPL-MCNC: 8.8 MG/DL (ref 8.5–10.1)
CHLORIDE SERPL-SCNC: 104 MMOL/L (ref 97–108)
CO2 SERPL-SCNC: 27 MMOL/L (ref 21–32)
COMMENT, HOLDF: NORMAL
CREAT SERPL-MCNC: 0.6 MG/DL (ref 0.55–1.02)
D DIMER PPP FEU-MCNC: 0.9 MG/L FEU (ref 0–0.65)
DIFFERENTIAL METHOD BLD: ABNORMAL
EOSINOPHIL # BLD: 0.2 K/UL (ref 0–0.4)
EOSINOPHIL NFR BLD: 1 % (ref 0–7)
ERYTHROCYTE [DISTWIDTH] IN BLOOD BY AUTOMATED COUNT: 12.1 % (ref 11.5–14.5)
GLOBULIN SER CALC-MCNC: 3.1 G/DL (ref 2–4)
GLUCOSE SERPL-MCNC: 72 MG/DL (ref 65–100)
HCG URINE, QL. (POC): NEGATIVE
HCT VFR BLD AUTO: 39.6 % (ref 35–47)
HGB BLD-MCNC: 12.7 G/DL (ref 11.5–16)
IMM GRANULOCYTES # BLD AUTO: 0.2 K/UL (ref 0–0.04)
IMM GRANULOCYTES NFR BLD AUTO: 1 % (ref 0–0.5)
LIPASE SERPL-CCNC: 121 U/L (ref 73–393)
LYMPHOCYTES # BLD: 4.5 K/UL (ref 0.8–3.5)
LYMPHOCYTES NFR BLD: 23 % (ref 12–49)
MCH RBC QN AUTO: 30.5 PG (ref 26–34)
MCHC RBC AUTO-ENTMCNC: 32.1 G/DL (ref 30–36.5)
MCV RBC AUTO: 95 FL (ref 80–99)
MONOCYTES # BLD: 1.4 K/UL (ref 0–1)
MONOCYTES NFR BLD: 7 % (ref 5–13)
NEUTS SEG # BLD: 13.1 K/UL (ref 1.8–8)
NEUTS SEG NFR BLD: 68 % (ref 32–75)
NRBC # BLD: 0.02 K/UL (ref 0–0.01)
NRBC BLD-RTO: 0.1 PER 100 WBC
PLATELET # BLD AUTO: 395 K/UL (ref 150–400)
PMV BLD AUTO: 9.4 FL (ref 8.9–12.9)
POTASSIUM SERPL-SCNC: 4.1 MMOL/L (ref 3.5–5.1)
PROT SERPL-MCNC: 6.9 G/DL (ref 6.4–8.2)
RBC # BLD AUTO: 4.17 M/UL (ref 3.8–5.2)
SAMPLES BEING HELD,HOLD: NORMAL
SODIUM SERPL-SCNC: 136 MMOL/L (ref 136–145)
VALID INTERNAL CONTROL?: YES
WBC # BLD AUTO: 19.6 K/UL (ref 3.6–11)

## 2022-04-01 PROCEDURE — 71275 CT ANGIOGRAPHY CHEST: CPT

## 2022-04-01 PROCEDURE — 74011000636 HC RX REV CODE- 636: Performed by: INTERNAL MEDICINE

## 2022-04-01 PROCEDURE — 81025 URINE PREGNANCY TEST: CPT | Performed by: INTERNAL MEDICINE

## 2022-04-01 RX ADMIN — IOPAMIDOL 80 ML: 755 INJECTION, SOLUTION INTRAVENOUS at 12:25

## 2022-04-01 NOTE — PROGRESS NOTES
Several attempts made to call patient - no mail box set up   Both numbers called   Elevated d dimer need to rule out pulmonary embolism, please have patient do CT chest PE protocol STAT, if unable to get test today then patient needs to go emergency room for CT to rule out pulmonary embolism

## 2022-09-30 ENCOUNTER — OFFICE VISIT (OUTPATIENT)
Dept: INTERNAL MEDICINE CLINIC | Age: 22
End: 2022-09-30

## 2022-09-30 VITALS
WEIGHT: 119 LBS | RESPIRATION RATE: 16 BRPM | OXYGEN SATURATION: 100 % | HEIGHT: 65 IN | BODY MASS INDEX: 19.83 KG/M2 | TEMPERATURE: 98.5 F

## 2022-09-30 DIAGNOSIS — Z23 NEEDS FLU SHOT: Primary | ICD-10-CM

## 2022-09-30 DIAGNOSIS — I95.1 ORTHOSTASIS: ICD-10-CM

## 2022-09-30 PROCEDURE — 99214 OFFICE O/P EST MOD 30 MIN: CPT | Performed by: INTERNAL MEDICINE

## 2022-09-30 PROCEDURE — 90686 IIV4 VACC NO PRSV 0.5 ML IM: CPT | Performed by: INTERNAL MEDICINE

## 2022-09-30 NOTE — PROGRESS NOTES
1. \"Have you been to the ER, urgent care clinic since your last visit? Hospitalized since your last visit? \" Yes Dr.Kimbery Dumont    2. \"Have you seen or consulted any other health care providers outside of the [address] [place] [address] since your last visit? \" Yes       3. For patients aged 39-70: Has the patient had a colonoscopy / FIT/ Cologuard? NA - based on age      If the patient is female:    4. For patients aged 41-77: Has the patient had a mammogram within the past 2 years? NA - based on age or sex      11. For patients aged 21-65: Has the patient had a pap smear?  No

## 2022-09-30 NOTE — PROGRESS NOTES
Ms. Barak Lim is presenting to follow up     CC:  Fatigue and Dizziness       HPI:  25 y o woman with a hx of anxiety presenting with dizziness and fatigue. She has a long history of anxiety which has been better since starting lexapro. She reports chronic fatigue that did not improve with lexapro  She is a full time student at [employer],  and works part time at [employer]. She is working through a difficult problem of painful sex - and had normal gyn lap exploration and gyn work up has been negative thus far. She was sent to pelvic PT and during PT there was concern for possible POTS due to changes in pulse and concern for hypermobile joints. She reports she can place her  hands flat on the floor without bending your knees  She can bend her thumb to touch your forearm    She had multiple joint dislocations as a child   She considers herself double jointed  Same with her mother    Mrs Citlali Monet has been struggling with fatigue. She reports upon standing she feels dizzy at times. Of note Her sisters younger have been diagnosed with POTS and take salt tablets      Review of systems:  Constitutional: negative for fever, chills, weight loss, night sweats   10 systems reviewed and negative other then HPI       History reviewed. No pertinent past medical history. Past Surgical History:   Procedure Laterality Date    HX HEENT      HX WISDOM TEETH EXTRACTION  2020       Allergies   Allergen Reactions    [place] Angioedema       Current Outpatient Medications on File Prior to Visit   Medication Sig Dispense Refill    albuterol (PROVENTIL HFA, VENTOLIN HFA, PROAIR HFA) 90 mcg/actuation inhaler Take 1 Puff by inhalation every six (6) hours as needed for Wheezing. 18 g 1    escitalopram oxalate (LEXAPRO) 10 mg tablet       sucralfate (CARAFATE) 100 mg/mL suspension Take 5 mL by mouth four (4) times daily. 420 mL 0    pantoprazole (PROTONIX) 40 mg tablet Take 1 Tablet by mouth two (2) times a day.  Take twice a day 30 minutes prior to meals 60 Tablet 1    fluconazole (DIFLUCAN) 150 mg tablet       norgestimate-ethinyl estradioL (ORTHO-CYCLEN, SPRINTEC) 0.25-35 mg-mcg tab Take 1 Tablet by mouth daily. valACYclovir (VALTREX) 500 mg tablet Take 1 Tablet by mouth two (2) times a day. Take for 3 days at onset on mouth sore 20 Tablet 0     No current facility-administered medications on file prior to visit. family history includes Cancer (age of onset: 37) in her mother; No Known Problems in her father. Social History     Socioeconomic History    Marital status:      Spouse name: Not on file    Number of children: Not on file    Years of education: Not on file    Highest education level: Not on file   Occupational History    Not on file   Tobacco Use    Smoking status: Never    Smokeless tobacco: Never   Vaping Use    Vaping Use: Never used   Substance and Sexual Activity    Alcohol use: No    Drug use: No    Sexual activity: Not Currently     Birth control/protection: Pill   Other Topics Concern    Not on file   Social History Narrative    Not on file     Social Determinants of Health     Financial Resource Strain: Low Risk     Difficulty of Paying Living Expenses: Not very hard   Food Insecurity: No Food Insecurity    Worried About Running Out of Food in the Last Year: Never true    Ran Out of Food in the Last Year: Never true   Transportation Needs: Not on file   Physical Activity: Not on file   Stress: Not on file   Social Connections: Not on file   Intimate Partner Violence: Not on file   Housing Stability: Not on file       Visit Vitals  Temp 98.5 °F (36.9 °C) (Temporal)   Resp 16   Ht 5' 5\" (1.651 m)   Wt 119 lb (54 kg)   LMP 09/22/2022 (Exact Date)   SpO2 100%   BMI 19.80 kg/m²      96/55 103/71 87/60   Pulse 2 57 72 87   Lying                     sitting        standing  pulse   General:  Well appearing female no acute distress  HEENT:   PERRL,normal conjunctiva.  External ear and canals normal, TMs normal.  Hearing normal to voice. Nose without edema or discharge, normal septum. Lips, teeth, gums normal.  Oropharynx: no erythema, no exudates, no lesions, normal tongue. Neck:  Supple. Thyroid normal size, nontender, without nodules. No carotid bruit. No masses or lymphadenopathy  Respiratory: no respiratory distress,  no wheezing, no rhonchi, no rales. No chest wall tenderness. Cardiovascular:  RRR, normal S1S2, no murmur. Gastrointestinal: normal bowel sounds, soft, nontender, without masses. No hepatosplenomegaly. Extremities +2 pulses, no edema, normal sensation   Musculoskeletal:  Normal gait. Normal digits and nails. Normal strength and tone, no atrophy, and no abnormal movement. Skin:  No rash, no lesions, no ulcers. Skin warm, normal turgor, without induration or nodules. Neuro:  A and OX4, fluent speech, cranial nerves normal 2-12.    Psych:  Normal affect      Lab Results   Component Value Date/Time    WBC 19.6 (H) 03/31/2022 11:57 AM    HGB 12.7 03/31/2022 11:57 AM    HCT 39.6 03/31/2022 11:57 AM    PLATELET 303 22/60/0219 11:57 AM    MCV 95.0 03/31/2022 11:57 AM     Lab Results   Component Value Date/Time    Sodium 136 03/31/2022 11:57 AM    Potassium 4.1 03/31/2022 11:57 AM    Chloride 104 03/31/2022 11:57 AM    CO2 27 03/31/2022 11:57 AM    Anion gap 5 03/31/2022 11:57 AM    Glucose 72 03/31/2022 11:57 AM    BUN 17 03/31/2022 11:57 AM    Creatinine 0.60 03/31/2022 11:57 AM    BUN/Creatinine ratio 28 (H) 03/31/2022 11:57 AM    GFR est AA >60 03/31/2022 11:57 AM    GFR est non-AA >60 03/31/2022 11:57 AM    Calcium 8.8 03/31/2022 11:57 AM     No results found for: CHOL, CHOLPOCT, CHOLX, CHLST, CHOLV, HDL, HDLPOC, HDLP, LDL, LDLCPOC, LDLC, DLDLP, VLDLC, VLDL, TGLX, TRIGL, TRIGP, TGLPOCT, CHHD, CHHDX  Lab Results   Component Value Date/Time    TSH 0.76 08/06/2021 11:00 AM     No results found for: HBA1C, CLN3ALUW, JBY6RLET, PRK8BMPL  No results found for: Jewell Kovacs, XQVID2, XQVID3, XQVID, VD3RIA                Assessment and Plan:     1. Needs flu shot  - INFLUENZA, FLUARIX, FLULAVAL, FLUZONE (AGE 6 MO+), AFLURIA(AGE 3Y+) IM, PF, 0.5 ML    2. Orthostasis  Possible POTS given change in heart rate from lying to standing, but would need tilt table test. I referred her to Dr Susan Chavez but office told me he is not doing this. I called [facility] and not taking new patients. I have sent a message to two cardiologist at [facility] and waiting on response. As soon as I hear back I will call patient    3. Anxiety: controlled on lexapro  May be contributing to #2 however it has improved anxiety and therefore will continue lexapro. She reports fatigue and dizziness was present prior to lexapro also       4. Hypermobile joints hEDS is  a possibility and considered much more common than the other types of EDS. To my knowledge there are no known genetic markers for hEDS. It is defined within the 2017 International Criteria; its phenotype includes general features of the EDS family, in a form that is less extreme (eg, milder skin stretch and scarring), but is nevertheless associated with significant symptoms. hEDS is associated with a number of comorbidities that are not part of the criteria such as gastrointestinal dysfunction, pelvic floor dysfunction, autonomic dysfunction, and others. I would like to have you see a specialist in genetics evaluation to ensure that we do have the correct diagnosis. This would only be available at [facility] or [facility]. I will work on referral. The appointment will probably be a few months out. I definitely do not believe there is any life threatening form of EDS - your CTA of the chest noted normal vasculature which is reassuring. As far as treatment options for autonomic disorder /POTS until we obtain testing  I suggest increasing fluid and salt intake as for the initial treatment of most patients with POTS.   Water and salt intake -- Oral fluid intake should be encouraged to a target of 3 L daily and a daily salt intake of 8 to 12 g of sodium chloride [. Start with 8grams. Available sources of sodium include table salt, sports tablets, sports beverages, oral rehydration salts, and some soups     think it is worth seen a  and also pursuing the tilt table test. Having the correct diagnosis will help us explore treatment options.     Louise Hinkle MD

## 2022-10-03 ENCOUNTER — PATIENT MESSAGE (OUTPATIENT)
Dept: INTERNAL MEDICINE CLINIC | Age: 22
End: 2022-10-03

## 2022-10-03 DIAGNOSIS — M24.80 GENERALIZED HYPERMOBILITY OF JOINTS: ICD-10-CM

## 2022-10-03 DIAGNOSIS — G90.A POTS (POSTURAL ORTHOSTATIC TACHYCARDIA SYNDROME): Primary | ICD-10-CM

## 2022-10-05 NOTE — TELEPHONE ENCOUNTER
From: Shayna Lorena  Sent: 10/5/2022 8:40 AM EDT  To: Carlos Lanier MD  Subject: cards referral    Thank you Dr. Jonathan Mckeon. Did you hear back from the therapist Dr. Cool ? I got your voicemail, call back anytime. Sabrina Davey expecting a call after 5 so I missed it! I also would like to look into genetic testing, if thats a possibility. 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 The specialist in [place] was thinking the same thing. Let me know what you think, happy to start with the tilt test though.

## 2022-10-19 ENCOUNTER — TELEPHONE (OUTPATIENT)
Dept: INTERNAL MEDICINE CLINIC | Age: 22
End: 2022-10-19

## 2022-10-19 NOTE — TELEPHONE ENCOUNTER
I spoke to Kavin Fernandez at 's office,  wanted to know if  saw POTS patients. Kavin Fernandez said yes, the patient does not have insurance, she will need to bring $100 to be seen. Also [facility] has a [employer] Oy, the patient can apply for. The patient called back, I gave her 's address, phone # and let her know about the co-pay and Cares card. The pt will call to schedule an appointment. Pt verbalized understanding of information discussed w/ no further questions at this time. PCP made aware.      Signed By: Anastacia Isaac LPN     October 19, 0236

## 2022-11-03 ENCOUNTER — PATIENT MESSAGE (OUTPATIENT)
Dept: INTERNAL MEDICINE CLINIC | Age: 22
End: 2022-11-03

## 2022-11-03 ENCOUNTER — OFFICE VISIT (OUTPATIENT)
Dept: CARDIOLOGY CLINIC | Age: 22
End: 2022-11-03

## 2022-11-03 VITALS
SYSTOLIC BLOOD PRESSURE: 92 MMHG | WEIGHT: 117 LBS | BODY MASS INDEX: 19.49 KG/M2 | HEART RATE: 60 BPM | DIASTOLIC BLOOD PRESSURE: 60 MMHG | HEIGHT: 65 IN

## 2022-11-03 DIAGNOSIS — R00.2 PALPITATIONS: ICD-10-CM

## 2022-11-03 DIAGNOSIS — K21.9 GASTROESOPHAGEAL REFLUX DISEASE WITHOUT ESOPHAGITIS: Primary | ICD-10-CM

## 2022-11-03 DIAGNOSIS — R55 NEAR SYNCOPE: Primary | ICD-10-CM

## 2022-11-03 DIAGNOSIS — R53.83 OTHER FATIGUE: ICD-10-CM

## 2022-11-03 PROCEDURE — 99204 OFFICE O/P NEW MOD 45 MIN: CPT | Performed by: INTERNAL MEDICINE

## 2022-11-03 PROCEDURE — 93000 ELECTROCARDIOGRAM COMPLETE: CPT | Performed by: INTERNAL MEDICINE

## 2022-11-03 RX ORDER — PANTOPRAZOLE SODIUM 40 MG/1
40 TABLET, DELAYED RELEASE ORAL DAILY
Qty: 30 TABLET | Refills: 1 | Status: SHIPPED
Start: 2022-11-03 | End: 2022-11-29 | Stop reason: DRUGHIGH

## 2022-11-03 NOTE — PATIENT INSTRUCTIONS
Your Tilt Table Test procedure has been scheduled for 12/7/22 at 12:30 pm, at [facility].    Please report to Admitting Department by 11:00 am, or 2 hours prior to your scheduled procedure. Please bring a list of your current medications and medication bottles, if able, to the hospital on this day. You will need labs drawn prior to your procedure. Please go to have this done no sooner than 11/7/22 and no later than 12/1/22. You should not stop your medication prior to your scheduled procedure.

## 2022-11-03 NOTE — PROGRESS NOTES
Cardiac Electrophysiology OFFICE Consultation Note     Subjective:       Ashley Kelley is a 25 y.o. patient who presents for evaluation/management of palpitations & near syncope. She was kindly referred by Dr. Jus Draper. She reports constant fatigue, some orthostatic lightheadedness, palpitations with minimal activity, near syncope. States she believes this may have started after a concussion in July 2021, possibly more since she had an ulcer this Spring. Also states she had COVID in 01/2022 & 06/2022. Denies any complete syncope. Chest CTA unremarkable. BP trends low normal.  Resting HR today 60 bpm.     States her sisters were diagnosed with POTS, takes salt tablets. Otherwise denies cardiac family history. Previous:   Full time student at [employer], works part time at [employer]. Problem List    Menstrual cramps ICD-10-CM: N94.6   ICD-9-CM: 625.3 5/25/2021         Current Outpatient Medications   Medication Sig Dispense Refill   · albuterol (PROVENTIL HFA, VENTOLIN HFA, PROAIR HFA) 90 mcg/actuation inhaler Take 1 Puff by inhalation every six (6) hours as needed for Wheezing. 18 g 1   · escitalopram oxalate (LEXAPRO) 10 mg tablet     Allergies   Allergen Reactions   · [place] Angioedema     No past medical history Ehler Danlos Syndrome  Past Surgical History:   Procedure Laterality Date   · HX HEENT   · HX HYSTEROSCOPY for ovarian condition  · HX WISDOM TEETH EXTRACTION 2020     Family History   Problem Relation Age of Onset   · Cancer Mother 37      breast cancer   · No Known Problems Father     Social History     Tobacco Use   · Smoking status: Never   · Smokeless tobacco: Never   Substance Use Topics   · Alcohol use: No         Review of Systems: Review of all other systems otherwise negative. Constitutional: Negative for fever, chills, weight loss, + chronic malaise/fatigue. HEENT: Negative for nosebleeds, vision changes.    Respiratory: Negative for cough, hemoptysis Cardiovascular: Negative for chest pain, + palpitations, no orthopnea, claudication, leg swelling, syncope, and PND. + near syncope (heart rate fast when standing up. She said HR 30 bpm up in PCP office)  Gastrointestinal: Negative for nausea, vomiting, diarrhea, blood in stool and melena. Genitourinary: Negative for dysuria, and hematuria. Musculoskeletal: Negative for myalgias, arthralgia. Reports hypermobile joints. Skin: Negative for rash. Heme: Does not bleed or bruise easily. Neurological: Negative for speech change and focal weakness. Objective:     Visit Vitals   BP 92/60   Pulse 60   Ht 5' 5\" (1.651 m)   Wt 117 lb (53.1 kg)   BMI 19.47 kg/m²       Physical Exam:   Constitutional: Well-developed and well-nourished. No respiratory distress. Head: Normocephalic and atraumatic. Eyes: Pupils are equal, round. ENT: Hearing grossly normal.   Neck: Supple. No JVD present. Cardiovascular: Normal rate, regular rhythm. Exam reveals no gallop and no friction rub. No murmur heard. Pulmonary/Chest: Effort normal and breath sounds normal. No wheezes. Abdominal: Soft, no tenderness. Musculoskeletal: Moves extremities independently. Normal gait. Vasc/lymphatic: No edema. Neurological: Alert, oriented. Skin: Skin is warm and dry. Psychiatric: Normal mood and affect. Behavior is normal. Judgment and thought content normal.       EKG: NSR 60 bpm. J point elevation      Assessment/Plan:     Imaging/Studies:   Chest CTA (04/01/2022): Normal.       ICD-10-CM ICD-9-CM    1. Near syncope  R55 780.2 AMB POC EKG ROUTINE W/ 12 LEADS, INTER & REP      ECHO ADULT COMPLETE      2. Palpitations  R00.2 785.1       3. Other fatigue  R53.83 780.79           Palpitations: Resting rate low normal, but reports racing heart with standing or activity. Chest CTA was unremarkable. Will get 2D echo to rule out structural abnormality.       She may have POTS  Reviewed risks/benefits of tilt table test. She agrees to proceed with scheduling. Near syncope: Baseline BP is low normal; see above, planning tilt table test.  Recommend liberal fluids & salt, compression stockings as well. Addendum  2 D echo normal     Future Appointments   Date Time Provider Juliet Handy   11/30/2022  3:45 PM VASCULAR, CHANA DHILLON BS AMB   1/6/2023  9:00 AM Benny Culp MD [facility] BS AMB        Date Time Provider Juliet Handy   1/6/2023 9:00 AM Benny Culp MD [facility] BS AMB     Thank you for involving me in this patient's care and please call with further concerns or questions. Cecilia Hooker M.D.    Electrophysiology/Cardiology   [facility]   [facility] [address], [facility] [address] [place] [address]   [phone]

## 2022-11-03 NOTE — TELEPHONE ENCOUNTER
Ellen Barnes 11/3/2022 1:19 PM EDT    Please review and advise  ----- Message -----  From: Isael De La O  Sent: 11/3/2022 8:08 AM EDT  To: [employer] Nurse Pool  Subject: Stomach ulcer     Good morning Dr. Simi Simmons,  I have been feeling terrible the past couple days and really think my stomach ulcer is back. Benja Flood been having stomach pain now for quite a while, and now it has changed into that heartburn feeling again. I am so fatigued as well and my heart is racing and I feel close to passing out pretty regularly. I thought it was just from pots but didnt realize that those could be symptoms of a stomach ulcer as well.

## 2022-11-04 LAB
H. PYLORI BREATH COLLECTION, 998167: NORMAL
UREA BREATH TEST QL: NEGATIVE

## 2022-11-29 RX ORDER — PANTOPRAZOLE SODIUM 20 MG/1
20 TABLET, DELAYED RELEASE ORAL DAILY
Qty: 90 TABLET | Refills: 1 | Status: SHIPPED | OUTPATIENT
Start: 2022-11-29

## 2022-11-30 ENCOUNTER — ANCILLARY PROCEDURE (OUTPATIENT)
Dept: CARDIOLOGY CLINIC | Age: 22
End: 2022-11-30

## 2022-11-30 VITALS — WEIGHT: 117 LBS | HEIGHT: 65 IN | BODY MASS INDEX: 19.49 KG/M2

## 2022-11-30 DIAGNOSIS — R55 NEAR SYNCOPE: ICD-10-CM

## 2022-11-30 DIAGNOSIS — R00.2 PALPITATIONS: ICD-10-CM

## 2022-11-30 DIAGNOSIS — R53.83 OTHER FATIGUE: ICD-10-CM

## 2022-11-30 PROCEDURE — 93306 TTE W/DOPPLER COMPLETE: CPT | Performed by: INTERNAL MEDICINE

## 2022-11-30 RX ORDER — SODIUM CHLORIDE 0.9 % (FLUSH) 0.9 %
5-40 SYRINGE (ML) INJECTION AS NEEDED
OUTPATIENT
Start: 2022-11-30

## 2022-11-30 RX ORDER — SODIUM CHLORIDE 0.9 % (FLUSH) 0.9 %
5-40 SYRINGE (ML) INJECTION EVERY 8 HOURS
OUTPATIENT
Start: 2022-11-30

## 2022-12-01 LAB
ANION GAP SERPL CALC-SCNC: 4 MMOL/L (ref 5–15)
BASOPHILS # BLD: 0.1 K/UL (ref 0–0.1)
BASOPHILS NFR BLD: 1 % (ref 0–1)
BUN SERPL-MCNC: 12 MG/DL (ref 6–20)
BUN/CREAT SERPL: 19 (ref 12–20)
CALCIUM SERPL-MCNC: 9 MG/DL (ref 8.5–10.1)
CHLORIDE SERPL-SCNC: 108 MMOL/L (ref 97–108)
CO2 SERPL-SCNC: 28 MMOL/L (ref 21–32)
CREAT SERPL-MCNC: 0.63 MG/DL (ref 0.55–1.02)
DIFFERENTIAL METHOD BLD: NORMAL
EOSINOPHIL # BLD: 0.1 K/UL (ref 0–0.4)
EOSINOPHIL NFR BLD: 1 % (ref 0–7)
ERYTHROCYTE [DISTWIDTH] IN BLOOD BY AUTOMATED COUNT: 11.6 % (ref 11.5–14.5)
GLUCOSE SERPL-MCNC: 92 MG/DL (ref 65–100)
HCT VFR BLD AUTO: 37.3 % (ref 35–47)
HGB BLD-MCNC: 12.3 G/DL (ref 11.5–16)
IMM GRANULOCYTES # BLD AUTO: 0 K/UL (ref 0–0.04)
IMM GRANULOCYTES NFR BLD AUTO: 0 % (ref 0–0.5)
LYMPHOCYTES # BLD: 3.1 K/UL (ref 0.8–3.5)
LYMPHOCYTES NFR BLD: 33 % (ref 12–49)
MCH RBC QN AUTO: 30.7 PG (ref 26–34)
MCHC RBC AUTO-ENTMCNC: 33 G/DL (ref 30–36.5)
MCV RBC AUTO: 93 FL (ref 80–99)
MONOCYTES # BLD: 0.7 K/UL (ref 0–1)
MONOCYTES NFR BLD: 7 % (ref 5–13)
NEUTS SEG # BLD: 5.5 K/UL (ref 1.8–8)
NEUTS SEG NFR BLD: 58 % (ref 32–75)
NRBC # BLD: 0 K/UL (ref 0–0.01)
NRBC BLD-RTO: 0 PER 100 WBC
PLATELET # BLD AUTO: 245 K/UL (ref 150–400)
PMV BLD AUTO: 10.2 FL (ref 8.9–12.9)
POTASSIUM SERPL-SCNC: 4.3 MMOL/L (ref 3.5–5.1)
RBC # BLD AUTO: 4.01 M/UL (ref 3.8–5.2)
SODIUM SERPL-SCNC: 140 MMOL/L (ref 136–145)
WBC # BLD AUTO: 9.5 K/UL (ref 3.6–11)

## 2022-12-02 LAB
ECHO AO ASC DIAM: 2.8 CM
ECHO AO ASCENDING AORTA INDEX: 1.77 CM/M2
ECHO AO ROOT DIAM: 2.8 CM
ECHO AO ROOT INDEX: 1.77 CM/M2
ECHO AV AREA PEAK VELOCITY: 2.7 CM2
ECHO AV AREA VTI: 2.7 CM2
ECHO AV AREA/BSA PEAK VELOCITY: 1.7 CM2/M2
ECHO AV AREA/BSA VTI: 1.7 CM2/M2
ECHO AV MEAN GRADIENT: 4 MMHG
ECHO AV MEAN VELOCITY: 0.9 M/S
ECHO AV PEAK GRADIENT: 8 MMHG
ECHO AV PEAK VELOCITY: 1.4 M/S
ECHO AV VELOCITY RATIO: 0.79
ECHO AV VTI: 28.8 CM
ECHO LA DIAMETER INDEX: 1.71 CM/M2
ECHO LA DIAMETER: 2.7 CM
ECHO LA TO AORTIC ROOT RATIO: 0.96
ECHO LA VOL 2C: 49 ML (ref 22–52)
ECHO LA VOL 4C: 33 ML (ref 22–52)
ECHO LA VOL BP: 43 ML (ref 22–52)
ECHO LA VOL/BSA BIPLANE: 27 ML/M2 (ref 16–34)
ECHO LA VOLUME AREA LENGTH: 47 ML
ECHO LA VOLUME INDEX A2C: 31 ML/M2 (ref 16–34)
ECHO LA VOLUME INDEX A4C: 21 ML/M2 (ref 16–34)
ECHO LA VOLUME INDEX AREA LENGTH: 30 ML/M2 (ref 16–34)
ECHO LV E' LATERAL VELOCITY: 18 CM/S
ECHO LV E' SEPTAL VELOCITY: 15 CM/S
ECHO LV EDV A2C: 113 ML
ECHO LV EDV A4C: 101 ML
ECHO LV EDV BP: 112 ML (ref 56–104)
ECHO LV EDV INDEX A4C: 64 ML/M2
ECHO LV EDV INDEX BP: 71 ML/M2
ECHO LV EDV NDEX A2C: 72 ML/M2
ECHO LV EJECTION FRACTION A2C: 65 %
ECHO LV EJECTION FRACTION A4C: 64 %
ECHO LV EJECTION FRACTION BIPLANE: 65 % (ref 55–100)
ECHO LV ESV A2C: 40 ML
ECHO LV ESV A4C: 36 ML
ECHO LV ESV BP: 40 ML (ref 19–49)
ECHO LV ESV INDEX A2C: 25 ML/M2
ECHO LV ESV INDEX A4C: 23 ML/M2
ECHO LV ESV INDEX BP: 25 ML/M2
ECHO LV FRACTIONAL SHORTENING: 38 % (ref 28–44)
ECHO LV INTERNAL DIMENSION DIASTOLE INDEX: 2.66 CM/M2
ECHO LV INTERNAL DIMENSION DIASTOLIC: 4.2 CM (ref 3.9–5.3)
ECHO LV INTERNAL DIMENSION SYSTOLIC INDEX: 1.65 CM/M2
ECHO LV INTERNAL DIMENSION SYSTOLIC: 2.6 CM
ECHO LV IVSD: 0.7 CM (ref 0.6–0.9)
ECHO LV MASS 2D: 93 G (ref 67–162)
ECHO LV MASS INDEX 2D: 58.9 G/M2 (ref 43–95)
ECHO LV POSTERIOR WALL DIASTOLIC: 0.8 CM (ref 0.6–0.9)
ECHO LV RELATIVE WALL THICKNESS RATIO: 0.38
ECHO LVOT AREA: 3.5 CM2
ECHO LVOT AV VTI INDEX: 0.77
ECHO LVOT DIAM: 2.1 CM
ECHO LVOT MEAN GRADIENT: 3 MMHG
ECHO LVOT PEAK GRADIENT: 5 MMHG
ECHO LVOT PEAK VELOCITY: 1.1 M/S
ECHO LVOT STROKE VOLUME INDEX: 48.9 ML/M2
ECHO LVOT SV: 77.2 ML
ECHO LVOT VTI: 22.3 CM
ECHO MV A VELOCITY: 0.32 M/S
ECHO MV AREA PHT: 3.9 CM2
ECHO MV E DECELERATION TIME (DT): 191.9 MS
ECHO MV E VELOCITY: 0.84 M/S
ECHO MV E/A RATIO: 2.63
ECHO MV E/E' LATERAL: 4.67
ECHO MV E/E' RATIO (AVERAGED): 5.13
ECHO MV E/E' SEPTAL: 5.6
ECHO MV PRESSURE HALF TIME (PHT): 55.7 MS
ECHO RV INTERNAL DIMENSION: 3.2 CM
ECHO RV TAPSE: 2.1 CM (ref 1.7–?)

## 2022-12-03 NOTE — PROGRESS NOTES
2 D echo normal    Future Appointments  12/7/2022  12:30 PM   TILT             [facility]

## 2022-12-05 ENCOUNTER — TELEPHONE (OUTPATIENT)
Dept: CARDIOLOGY CLINIC | Age: 22
End: 2022-12-05

## 2022-12-05 NOTE — TELEPHONE ENCOUNTER
----- Message from Michelle Monet MD sent at 12/2/2022  7:10 PM EST -----  2 D echo normal    Future Appointments  12/7/2022  12:30 PM   TILT             [facility]

## 2022-12-07 ENCOUNTER — HOSPITAL ENCOUNTER (OUTPATIENT)
Dept: NON INVASIVE DIAGNOSTICS | Age: 22
Discharge: HOME | End: 2022-12-07
Attending: INTERNAL MEDICINE

## 2022-12-07 VITALS
DIASTOLIC BLOOD PRESSURE: 59 MMHG | SYSTOLIC BLOOD PRESSURE: 98 MMHG | HEART RATE: 66 BPM | RESPIRATION RATE: 16 BRPM | OXYGEN SATURATION: 96 %

## 2022-12-07 DIAGNOSIS — R55 SYNCOPE AND COLLAPSE: ICD-10-CM

## 2022-12-07 PROCEDURE — 74011250636 HC RX REV CODE- 250/636: Performed by: INTERNAL MEDICINE

## 2022-12-07 PROCEDURE — 93660 TILT TABLE EVALUATION: CPT

## 2022-12-07 RX ORDER — B.COAGULANS/DIGESTIVE ENZYM 10 2B CELL
1 CAPSULE ORAL DAILY
COMMUNITY

## 2022-12-07 RX ORDER — MIDODRINE HYDROCHLORIDE 5 MG/1
5 TABLET ORAL
Qty: 90 TABLET | Refills: 5 | Status: SHIPPED | OUTPATIENT
Start: 2022-12-07 | End: 2023-01-06

## 2022-12-07 RX ADMIN — SODIUM CHLORIDE 250 ML: 9 INJECTION, SOLUTION INTRAVENOUS at 14:12

## 2022-12-07 NOTE — PROCEDURES
Cardiac Procedure Note   Patient: Yarelis Granda  MRN: [id number]  SSN: [id number]   YOB: 2000 Age: 25 y.o.   Sex: female    Date of Procedure: 12/7/2022   Pre-procedure Diagnosis: near syncope  Post-procedure Diagnosis: neurocardiogenic syncope  Procedure: tilt  :  Dr. Adriano Lewis MD    Assistant(s):  None  Anesthesia: none  Estimated Blood Loss: none  Specimens Removed: None  Findings: mixed response neurally mediated syncope  Low bp and bradycardia-junctional rhythm   Near syncope  Complications: None   Implants:  None  Signed by:  Adriano Lewis MD  12/7/2022  2:18 PM

## 2022-12-07 NOTE — DISCHARGE INSTRUCTIONS
Midodrine 5 mg three times a day with foods sent to pharmacy  Eat salty foods and drinks 64 oz water a day    Appointment called by my nurse  Midodrine (By mouth)   Midodrine (Jung)  Treats a kind of low blood pressure that can cause severe dizziness or fainting. Brand Name(s):   There may be other brand names for this medicine. When This Medicine Should Not Be Used: This medicine is not right for everyone. Do not use it if you had an allergic reaction to midodrine, or if you have severe organic heart disease, an overactive thyroid, problems emptying your bladder, high blood pressure, or pheochromocytoma. How to Use This Medicine:   Tablet  Your doctor will tell you how much medicine to use. Do not use more than directed. Your doctor may tell you to take this medicine in the morning, at noon, and in the late afternoon. Wait at least 4 hours between each dose of this medicine. Lying down after taking this medicine may cause your blood pressure to get too high. Do not take the last dose of the day after 6 pm or after the evening meal, and be sure you wait at least 4 hours after the last dose before going to bed. Missed dose: Take the missed dose as soon as possible, unless you are due to take your next dose within 3 hours. Do not use extra medicine to make up for a missed dose. Store the medicine in a closed container at room temperature, away from heat, moisture, and direct light. Drugs and Foods to Avoid:   Ask your doctor or pharmacist before using any other medicine, including over-the-counter medicines, vitamins, and herbal products. Some medicines can affect how midodrine works.  Tell your doctor if you are using any of the following:  Cimetidine, digoxin, dihydroergotamine, droxidopa, flecainide, fludrocortisone acetate, metformin, procainamide, quinidine, ranitidine, or triamterene  Blood pressure medicine (including doxazosin, prazosin, terazosin)  Cold and allergy medicine (including ephedrine, phenylephrine, phenylpropanolamine, pseudoephedrine)  Diet pill  MAO inhibitor (MAOI)  Thyroid supplement  Warnings While Using This Medicine:   Tell your doctor if you are pregnant or breastfeeding, or if you have kidney disease, liver disease, diabetes, glaucoma, or trouble urinating. This medicine will raise your blood pressure. Your blood pressure may get too high, especially when you lie down. Your doctor may want you to sleep with the head of your bed raised to help prevent this. Your doctor will check your progress and the effects of this medicine at regular visits. Keep all appointments. You will need to have your blood pressure checked regularly. Keep all medicine out of the reach of children. Never share your medicine with anyone. Possible Side Effects While Using This Medicine:   Call your doctor right away if you notice any of these side effects: Allergic reaction: Itching or hives, swelling in your face or hands, swelling or tingling in your mouth or throat, chest tightness, trouble breathing  Changes in vision  Chest pain  Difficult or painful urination, burning while urinating  Fast, slow, or pounding heartbeat  If you notice these less serious side effects, talk with your doctor:   Burning, crawling, itching, numbness, prickling, \"pins and needles\", or tingling feelings  Goosebumps  If you notice other side effects that you think are caused by this medicine, tell your doctor. Call your doctor for medical advice about side effects. You may report side effects to FDA at 9-416-FDA-0124  © 2017 [facility] Information is for End User's use only and may not be sold, redistributed or otherwise used for commercial purposes. The above information is an  only. It is not intended as medical advice for individual conditions or treatments. Talk to your doctor, nurse or pharmacist before following any medical regimen to see if it is safe and effective for you.

## 2022-12-07 NOTE — PROGRESS NOTES
Pt. Given discharge instructions post tilt table. Given info sheet for midodrine and MD informed pt. To  from her pharmacy today and encouraged eating salty foods. A snack of diet gingerale and peanut butter crackers given. Pt. D/c ied ambulatory to front entrance.  Symptoms resolved

## 2022-12-15 ENCOUNTER — PATIENT MESSAGE (OUTPATIENT)
Dept: CARDIOLOGY CLINIC | Age: 22
End: 2022-12-15

## 2022-12-15 NOTE — TELEPHONE ENCOUNTER
Called patient to advise Dr. Yolanda Torrez doesn't have anything until her appt in Jan.    Offered an appt with Dr Justin Toledo for 12/16   She declined and said she would wait for her Jan appt and if it gets worse she will call back

## 2023-01-06 ENCOUNTER — OFFICE VISIT (OUTPATIENT)
Dept: INTERNAL MEDICINE CLINIC | Age: 23
End: 2023-01-06

## 2023-01-06 VITALS
DIASTOLIC BLOOD PRESSURE: 63 MMHG | HEIGHT: 65 IN | TEMPERATURE: 97.8 F | OXYGEN SATURATION: 100 % | WEIGHT: 123.4 LBS | RESPIRATION RATE: 18 BRPM | BODY MASS INDEX: 20.56 KG/M2 | HEART RATE: 69 BPM | SYSTOLIC BLOOD PRESSURE: 96 MMHG

## 2023-01-06 DIAGNOSIS — Z00.00 ANNUAL PHYSICAL EXAM: Primary | ICD-10-CM

## 2023-01-06 DIAGNOSIS — K21.9 GASTROESOPHAGEAL REFLUX DISEASE WITHOUT ESOPHAGITIS: ICD-10-CM

## 2023-01-06 RX ORDER — FAMOTIDINE 20 MG/1
20 TABLET, FILM COATED ORAL
Qty: 30 TABLET | Refills: 2 | Status: SHIPPED | OUTPATIENT
Start: 2023-01-06

## 2023-01-06 NOTE — PROGRESS NOTES
Chief Complaint   Patient presents with    Physical     Patient is here today for her yearly physical.          Vitals:    01/06/23 0917   BP: 96/63   Pulse: 69   Resp: 18   Temp: 97.8 °F (36.6 °C)   TempSrc: Oral   SpO2: 100%   Weight: 123 lb 6.4 oz (56 kg)   Height: 5' 5\" (1.651 m)   PainSc:   0 - No pain   LMP: 12/09/2022       Current Outpatient Medications on File Prior to Visit   Medication Sig Dispense Refill    B.coagulans-digestive enzym 10 (Digest Adv Probio Plus Gas) 2 billion cell cap Take 1 Tablet by mouth daily. pantoprazole (PROTONIX) 20 mg tablet Take 1 Tablet by mouth daily. 90 Tablet 1    albuterol (PROVENTIL HFA, VENTOLIN HFA, PROAIR HFA) 90 mcg/actuation inhaler Take 1 Puff by inhalation every six (6) hours as needed for Wheezing. 18 g 1    escitalopram oxalate (LEXAPRO) 10 mg tablet       midodrine (PROAMATINE) 5 mg tablet Take 1 Tablet by mouth three (3) times daily (with meals) for 30 days. Indications: a feeling of dizziness upon standing due to a drop in blood pressure 90 Tablet 5     No current facility-administered medications on file prior to visit. Health Maintenance Due   Topic    DTaP/Tdap/Td series (2 - Tdap)    COVID-19 Vaccine (3 - Booster for [employer] series)    Pap Smear     Depression Screen        1. \"Have you been to the ER, urgent care clinic since your last visit? Hospitalized since your last visit? \" No    2. \"Have you seen or consulted any other health care providers outside of the [address] [place] [address] since your last visit? \" No     3. For patients aged 39-70: Has the patient had a colonoscopy / FIT/ Cologuard? NA - based on age      If the patient is female:    4. For patients aged 41-77: Has the patient had a mammogram within the past 2 years? NA - based on age or sex      11. For patients aged 21-65: Has the patient had a pap smear?  Yes - no Care Gap present

## 2023-01-06 NOTE — PATIENT INSTRUCTIONS
Change to famotidine which pepcid - may be cheaper over the counter   Save the protonix for flair ups   Keep up the good work

## 2023-01-06 NOTE — PROGRESS NOTES
Ms. Lisseth Reed is presenting to follow up     CC:  Physical (Patient is here today for her yearly physical.)       HPI:    26 yo woman presenting for yearly physical    Over all she is doing better    In the interval she saw Dr Robbie negrete and had tilt table test and diagnosed with neurogenic syncope. Her BP was low and she was started on midodrine and did not tolerate medication caused dizziness. She stopped medication. She is drinking more water and salt and feels better. No dizziness. She had a flair up of GERD towards the end of the semester in 2022 and resumed protonix and doing well symptoms resolved and decreased the dose to 20mg . We discussed changing to pepcid 20mg daily. She has a hx of anxiety and is taking lexapro 10mg with good control. She completed pelvic PT therapy and doing better ( had painful intercourse with  ) - overall doing better.       Pap smear done 2021 June normal       Review of systems:  Constitutional: negative for fever, chills, weight loss, night sweats   Eyes : negative for vision changes, eye pain and discharge  Nose and Throat: negative for tinnitus, sore throat   Cardiovascular: negative for chest pain, palpitations and shortness of breath  Respiratory: negative for shortness of breath, cough and wheezing   Gastroinstestinal: negative for abdominal pain, nausea, vomiting, diarrhea, constipation, and blood in the stool  Musculoskeletal: negative for back ache and joint ache   Genitourinary: negative for dysuria, nocturia, polyuria and hematuria   Neurologic: Negative for focal weakness, numbness or incoordination  Skin: negative for rash, pruritus  Hematologic: negative for easy bruising      Past Medical History:   Diagnosis Date    Neurogenic syncope         Past Surgical History:   Procedure Laterality Date    HX HEENT      HX HYSTEROSCOPY N/A     HX WISDOM TEETH EXTRACTION  2020       Allergies   Allergen Reactions    Cucumber Angioedema       Current Outpatient Medications on File Prior to Visit   Medication Sig Dispense Refill    B.coagulans-digestive enzym 10 (Digest Adv Probio Plus Gas) 2 billion cell cap Take 1 Tablet by mouth daily. pantoprazole (PROTONIX) 20 mg tablet Take 1 Tablet by mouth daily. 90 Tablet 1    albuterol (PROVENTIL HFA, VENTOLIN HFA, PROAIR HFA) 90 mcg/actuation inhaler Take 1 Puff by inhalation every six (6) hours as needed for Wheezing. 18 g 1    escitalopram oxalate (LEXAPRO) 10 mg tablet       midodrine (PROAMATINE) 5 mg tablet Take 1 Tablet by mouth three (3) times daily (with meals) for 30 days. Indications: a feeling of dizziness upon standing due to a drop in blood pressure 90 Tablet 5     No current facility-administered medications on file prior to visit. family history includes Cancer (age of onset: 37) in her mother; No Known Problems in her father. Social History     Socioeconomic History    Marital status:      Spouse name: Not on file    Number of children: Not on file    Years of education: Not on file    Highest education level: Not on file   Occupational History    Not on file   Tobacco Use    Smoking status: Never    Smokeless tobacco: Never   Vaping Use    Vaping Use: Never used   Substance and Sexual Activity    Alcohol use:  Yes     Alcohol/week: 3.0 standard drinks     Types: 3 Glasses of wine per week     Comment: Average 3 drinks/wk    Drug use: No    Sexual activity: Yes     Partners: Male     Birth control/protection: Condom   Other Topics Concern    Not on file   Social History Narrative    Not on file     Social Determinants of Health     Financial Resource Strain: Low Risk     Difficulty of Paying Living Expenses: Not hard at all   Food Insecurity: No Food Insecurity    Worried About Running Out of Food in the Last Year: Never true    Ran Out of Food in the Last Year: Never true   Transportation Needs: Not on file   Physical Activity: Not on file   Stress: Not on file   Social Connections: Not on file   Intimate Partner Violence: Not on file   Housing Stability: Not on file       Visit Vitals  BP 96/63 (BP 1 Location: Left upper arm, BP Patient Position: Sitting, BP Cuff Size: Large adult)   Pulse 69   Temp 97.8 °F (36.6 °C) (Oral)   Resp 18   Ht 5' 5\" (1.651 m)   Wt 123 lb 6.4 oz (56 kg)   LMP 12/09/2022 (Exact Date)   SpO2 100%   BMI 20.53 kg/m²     General:  Well appearing female no acute distress  HEENT:   PERRL,normal conjunctiva. External ear and canals normal, TMs normal.  Hearing normal to voice. Nose without edema or discharge, normal septum. Lips, teeth, gums normal.  Oropharynx: no erythema, no exudates, no lesions, normal tongue. Neck:  Supple. Thyroid normal size, nontender, without nodules. No carotid bruit. No masses or lymphadenopathy  Respiratory: no respiratory distress,  no wheezing, no rhonchi, no rales. No chest wall tenderness. Cardiovascular:  RRR, normal S1S2, no murmur. Gastrointestinal: normal bowel sounds, soft, nontender, without masses. No hepatosplenomegaly. Extremities +2 pulses, no edema, normal sensation   Musculoskeletal:  Normal gait. Normal digits and nails. Normal strength and tone, no atrophy, and no abnormal movement. Skin:  No rash, no lesions, no ulcers. Skin warm, normal turgor, without induration or nodules. Neuro:  A and OX4, fluent speech, cranial nerves normal 2-12. Sensation normal to light touch.   DTR symmetrical  Psych:  Normal affect      Lab Results   Component Value Date/Time    WBC 9.5 11/30/2022 04:06 PM    HGB 12.3 11/30/2022 04:06 PM    HCT 37.3 11/30/2022 04:06 PM    PLATELET 307 42/22/8074 04:06 PM    MCV 93.0 11/30/2022 04:06 PM     Lab Results   Component Value Date/Time    Sodium 140 11/30/2022 04:06 PM    Potassium 4.3 11/30/2022 04:06 PM    Chloride 108 11/30/2022 04:06 PM    CO2 28 11/30/2022 04:06 PM    Anion gap 4 (L) 11/30/2022 04:06 PM    Glucose 92 11/30/2022 04:06 PM    BUN 12 11/30/2022 04:06 PM    Creatinine 0.63 11/30/2022 04:06 PM    BUN/Creatinine ratio 19 11/30/2022 04:06 PM    GFR est AA >60 03/31/2022 11:57 AM    GFR est non-AA >60 03/31/2022 11:57 AM    Calcium 9.0 11/30/2022 04:06 PM     No results found for: CHOL, CHOLPOCT, CHOLX, CHLST, CHOLV, HDL, HDLPOC, HDLP, LDL, LDLCPOC, LDLC, DLDLP, VLDLC, VLDL, TGLX, TRIGL, TRIGP, TGLPOCT, CHHD, CHHDX  Lab Results   Component Value Date/Time    TSH 0.76 08/06/2021 11:00 AM     No results found for: HBA1C, QIH1EQXB, KHX6WBYE  No results found for: VITD3, XQVID2, XQVID3, XQVID, VD3RIA                Assessment and Plan:     Annual exam  She is doing well    Anxiety: well controlled on lexapro     Neurogenic syncope: had tilt table test and no POTS. She did not tolerate midodrine. She has increased her fluid intake and doing better. Encouraged to exercise    Gastroesophageal reflux disease without esophagitis- stop PPI and change to PEPCID  - famotidine (PEPCID) 20 mg tablet; Take 1 Tablet by mouth nightly. Dispense: 30 Tablet;  Refill: 2    She is due for TDAP and will return to have this done          Missy Puente MD

## 2023-03-16 ENCOUNTER — TELEPHONE (OUTPATIENT)
Dept: INTERNAL MEDICINE CLINIC | Age: 23
End: 2023-03-16

## 2023-03-16 DIAGNOSIS — K21.9 GASTROESOPHAGEAL REFLUX DISEASE WITHOUT ESOPHAGITIS: ICD-10-CM

## 2023-03-16 RX ORDER — FAMOTIDINE 20 MG/1
20 TABLET, FILM COATED ORAL
Qty: 30 TABLET | Refills: 2 | Status: SHIPPED | OUTPATIENT
Start: 2023-03-16

## 2023-03-16 NOTE — TELEPHONE ENCOUNTER
Future Appointments:  Future Appointments   Date Time Provider Juliet Rozina   4/11/2023  8:15 AM Appa Renuka Alexander MD [facility] BS AMB        Last Appointment With Me:  1/6/2023     Requested Prescriptions     Pending Prescriptions Disp Refills    famotidine (PEPCID) 20 mg tablet 30 Tablet 2     Sig: Take 1 Tablet by mouth nightly.

## 2023-05-26 RX ORDER — ALBUTEROL SULFATE 90 UG/1
1 AEROSOL, METERED RESPIRATORY (INHALATION) EVERY 6 HOURS PRN
COMMUNITY
Start: 2022-03-29

## 2023-05-26 RX ORDER — FAMOTIDINE 20 MG/1
1 TABLET, FILM COATED ORAL NIGHTLY
COMMUNITY
Start: 2023-03-16

## 2023-05-26 RX ORDER — ESCITALOPRAM OXALATE 10 MG/1
TABLET ORAL
COMMUNITY
Start: 2022-02-15

## 2023-05-26 RX ORDER — PANTOPRAZOLE SODIUM 40 MG/1
40 TABLET, DELAYED RELEASE ORAL DAILY
COMMUNITY
Start: 2023-04-11

## 2023-09-06 ENCOUNTER — TELEPHONE (OUTPATIENT)
Age: 23
End: 2023-09-06

## 2023-09-06 NOTE — TELEPHONE ENCOUNTER
Pt is on day seven of period cramps. Keeping from daily activities and she is not bleeding at all. Pt would like a call as she can't get a call from OB     Please call to advise.

## 2024-03-05 ENCOUNTER — PATIENT MESSAGE (OUTPATIENT)
Age: 24
End: 2024-03-05

## 2024-03-05 DIAGNOSIS — J01.00 ACUTE NON-RECURRENT MAXILLARY SINUSITIS: Primary | ICD-10-CM

## 2024-03-05 RX ORDER — AMOXICILLIN AND CLAVULANATE POTASSIUM 875; 125 MG/1; MG/1
1 TABLET, FILM COATED ORAL 2 TIMES DAILY
Qty: 14 TABLET | Refills: 0 | Status: SHIPPED | OUTPATIENT
Start: 2024-03-05 | End: 2024-03-12

## 2024-03-05 NOTE — TELEPHONE ENCOUNTER
Arverne, Crissy MUNIZ MA 3/5/2024 10:44 AM EST    Do you want me to add as VV   ----- Message -----  From: Svitlana Solomon \"Rosa\"  Sent: 3/5/2024 10:43 AM EST  To: *  Subject: Sinus infection     Hi Dr. Carr,  I have been dealing with a cold for the past week and a half that looks to have turned into a sinus infection. It’s been three days and I have no relief. My throat is extremely sore, and my face hurts very badly with bruising under my eyes. I really can’t afford to go to urgent care and was told that there are no   E-visits available until Friday. Is there anything you can do for me? I don’t know if you can send in a prescription without seeing me but I don’t think I can wait until Friday. Thank you.

## 2024-03-15 ENCOUNTER — PATIENT MESSAGE (OUTPATIENT)
Age: 24
End: 2024-03-15

## 2024-03-15 DIAGNOSIS — J40 BRONCHITIS, NOT SPECIFIED AS ACUTE OR CHRONIC: Primary | ICD-10-CM

## 2024-03-15 RX ORDER — AMOXICILLIN AND CLAVULANATE POTASSIUM 875; 125 MG/1; MG/1
1 TABLET, FILM COATED ORAL 2 TIMES DAILY
Qty: 14 TABLET | Refills: 0 | Status: SHIPPED | OUTPATIENT
Start: 2024-03-15 | End: 2024-03-22

## 2024-03-15 NOTE — TELEPHONE ENCOUNTER
From: Svitlana Solomon  To: Dr. Korina Charles  Sent: 3/15/2024 10:36 AM EDT  Subject: Sinus infection    I can’t seem to shake this sinus infection. It got almost better but did not fully go away and now I am feeling congested all over again. Any other recommendations? Should I come in? I took the full 7 days of amoxicillin and have been taking mucinex and drinking water and doing sinus rinses since then, but I am really worried about it getting badly infected again. Thank you!

## 2024-04-10 SDOH — ECONOMIC STABILITY: FOOD INSECURITY: WITHIN THE PAST 12 MONTHS, YOU WORRIED THAT YOUR FOOD WOULD RUN OUT BEFORE YOU GOT MONEY TO BUY MORE.: NEVER TRUE

## 2024-04-10 SDOH — ECONOMIC STABILITY: INCOME INSECURITY: HOW HARD IS IT FOR YOU TO PAY FOR THE VERY BASICS LIKE FOOD, HOUSING, MEDICAL CARE, AND HEATING?: NOT VERY HARD

## 2024-04-10 SDOH — ECONOMIC STABILITY: TRANSPORTATION INSECURITY
IN THE PAST 12 MONTHS, HAS LACK OF TRANSPORTATION KEPT YOU FROM MEETINGS, WORK, OR FROM GETTING THINGS NEEDED FOR DAILY LIVING?: NO

## 2024-04-10 SDOH — ECONOMIC STABILITY: FOOD INSECURITY: WITHIN THE PAST 12 MONTHS, THE FOOD YOU BOUGHT JUST DIDN'T LAST AND YOU DIDN'T HAVE MONEY TO GET MORE.: NEVER TRUE

## 2024-04-10 SDOH — ECONOMIC STABILITY: HOUSING INSECURITY
IN THE LAST 12 MONTHS, WAS THERE A TIME WHEN YOU DID NOT HAVE A STEADY PLACE TO SLEEP OR SLEPT IN A SHELTER (INCLUDING NOW)?: NO

## 2024-04-11 ENCOUNTER — OFFICE VISIT (OUTPATIENT)
Age: 24
End: 2024-04-11
Payer: MEDICAID

## 2024-04-11 VITALS
HEIGHT: 65 IN | TEMPERATURE: 97.4 F | HEART RATE: 83 BPM | RESPIRATION RATE: 20 BRPM | OXYGEN SATURATION: 98 % | BODY MASS INDEX: 19.26 KG/M2 | DIASTOLIC BLOOD PRESSURE: 63 MMHG | WEIGHT: 115.6 LBS | SYSTOLIC BLOOD PRESSURE: 98 MMHG

## 2024-04-11 DIAGNOSIS — R09.82 POSTNASAL DRIP: Primary | ICD-10-CM

## 2024-04-11 DIAGNOSIS — J02.9 SORE THROAT: ICD-10-CM

## 2024-04-11 PROCEDURE — 99212 OFFICE O/P EST SF 10 MIN: CPT | Performed by: INTERNAL MEDICINE

## 2024-04-11 RX ORDER — FLUOXETINE HYDROCHLORIDE 20 MG/1
20 CAPSULE ORAL DAILY
COMMUNITY
Start: 2024-02-24

## 2024-04-11 RX ORDER — AZELASTINE 1 MG/ML
1 SPRAY, METERED NASAL 2 TIMES DAILY
Qty: 60 ML | Refills: 1 | Status: SHIPPED | OUTPATIENT
Start: 2024-04-11

## 2024-04-11 ASSESSMENT — PATIENT HEALTH QUESTIONNAIRE - PHQ9
SUM OF ALL RESPONSES TO PHQ QUESTIONS 1-9: 0
SUM OF ALL RESPONSES TO PHQ QUESTIONS 1-9: 0
SUM OF ALL RESPONSES TO PHQ9 QUESTIONS 1 & 2: 0
SUM OF ALL RESPONSES TO PHQ QUESTIONS 1-9: 0
1. LITTLE INTEREST OR PLEASURE IN DOING THINGS: NOT AT ALL
2. FEELING DOWN, DEPRESSED OR HOPELESS: NOT AT ALL
SUM OF ALL RESPONSES TO PHQ QUESTIONS 1-9: 0

## 2024-04-11 NOTE — PROGRESS NOTES
Chief Complaint   Patient presents with    Sinus Problem     \"Have you been to the ER, urgent care clinic since your last visit?  Hospitalized since your last visit?\"    Yes, ER for cyst on right ovary  “Have you seen or consulted any other health care providers outside of [facility] since your last visit?”    NO

## 2024-04-11 NOTE — PROGRESS NOTES
Ms. Svitlana Solomon is presenting to follow up     CC:  Sinus Problem       HPI:    Ms. Svitlana Solomon   is a 23 y.o. female with a hx of     March 5th stated with sinus symptoms , brusing under eyes, fever.   Had 2 rounds of antibiotics and better but still has throat discomfort.  Drainage  Using flonase and zyrtec       Review of systems:  Constitutional: negative for fever, chills, weight loss, night sweats   Eyes : negative for vision changes, eye pain and discharge  Nose and Throat: negative for tinnitus, sore throat   Cardiovascular: negative for chest pain, palpitations and shortness of breath  Respiratory: negative for shortness of breath, cough and wheezing   Gastroinstestinal: negative for abdominal pain, nausea, vomiting, diarrhea, constipation, and blood in the stool  Musculoskeletal: negative for back ache and joint ache   Genitourinary: negative for dysuria, nocturia, polyuria and hematuria   Neurologic: Negative for focal weakness, numbness or incoordination  Skin: negative for rash, pruritus  Hematologic: negative for easy bruising    10 systems reviewed and negative other then HPI     Past Medical History:   Diagnosis Date    Anxiety     Neurogenic syncope         Past Surgical History:   Procedure Laterality Date    HEENT      HYSTEROSCOPY N/A     WISDOM TOOTH EXTRACTION  2020       Allergies   Allergen Reactions    Cucumber Extract Angioedema       Current Outpatient Medications on File Prior to Visit   Medication Sig Dispense Refill    FLUoxetine (PROZAC) 20 MG capsule Take 1 capsule by mouth daily      albuterol sulfate HFA (PROVENTIL;VENTOLIN;PROAIR) 108 (90 Base) MCG/ACT inhaler Inhale 1 puff into the lungs every 6 hours as needed      famotidine (PEPCID) 20 MG tablet Take 1 tablet by mouth nightly      escitalopram (LEXAPRO) 10 MG tablet ceived the following from [facility]: Outside name: escitalopram oxalate (LEXAPRO) 10 mg tablet (Patient not taking: Reported on

## 2024-06-03 ENCOUNTER — TELEPHONE (OUTPATIENT)
Age: 24
End: 2024-06-03

## 2024-06-03 DIAGNOSIS — M54.2 NECK PAIN: Primary | ICD-10-CM
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
## 2024-06-03 NOTE — TELEPHONE ENCOUNTER
Spoke to pt and used two pt identifiers.  Patient notified of response from Korina Correa MD    States understanding and provided fax number to [facility]  [phone]